# Patient Record
Sex: FEMALE | Race: WHITE | HISPANIC OR LATINO
[De-identification: names, ages, dates, MRNs, and addresses within clinical notes are randomized per-mention and may not be internally consistent; named-entity substitution may affect disease eponyms.]

---

## 2018-08-19 ENCOUNTER — TRANSCRIPTION ENCOUNTER (OUTPATIENT)
Age: 38
End: 2018-08-19

## 2018-10-24 ENCOUNTER — APPOINTMENT (OUTPATIENT)
Dept: OBGYN | Facility: CLINIC | Age: 38
End: 2018-10-24
Payer: COMMERCIAL

## 2018-10-24 VITALS
WEIGHT: 118 LBS | BODY MASS INDEX: 23.79 KG/M2 | DIASTOLIC BLOOD PRESSURE: 85 MMHG | SYSTOLIC BLOOD PRESSURE: 127 MMHG | HEIGHT: 59 IN

## 2018-10-24 DIAGNOSIS — Z87.440 PERSONAL HISTORY OF URINARY (TRACT) INFECTIONS: ICD-10-CM

## 2018-10-24 PROCEDURE — 99385 PREV VISIT NEW AGE 18-39: CPT

## 2018-10-26 LAB — HPV HIGH+LOW RISK DNA PNL CVX: NOT DETECTED

## 2018-10-30 LAB — CYTOLOGY CVX/VAG DOC THIN PREP: NORMAL

## 2019-03-21 ENCOUNTER — APPOINTMENT (OUTPATIENT)
Dept: OBGYN | Facility: CLINIC | Age: 39
End: 2019-03-21
Payer: COMMERCIAL

## 2019-03-21 PROCEDURE — 99213 OFFICE O/P EST LOW 20 MIN: CPT

## 2019-03-21 NOTE — CHIEF COMPLAINT
[Follow Up] : follow up GYN visit [FreeTextEntry1] : Pt presents for removal of paraguard. Has had it for 10 years.  Does not wish to have it removed without starting another form of contraception. Is interested in arm implant.

## 2019-04-08 ENCOUNTER — APPOINTMENT (OUTPATIENT)
Dept: OBGYN | Facility: CLINIC | Age: 39
End: 2019-04-08
Payer: COMMERCIAL

## 2019-04-08 VITALS
BODY MASS INDEX: 24.39 KG/M2 | HEIGHT: 59 IN | DIASTOLIC BLOOD PRESSURE: 92 MMHG | SYSTOLIC BLOOD PRESSURE: 135 MMHG | WEIGHT: 121 LBS

## 2019-04-08 PROCEDURE — 58301 REMOVE INTRAUTERINE DEVICE: CPT

## 2019-04-08 NOTE — PROCEDURE
[Paraguard] : Abdiel [ IUD] :  IUD [Consent Obtained] : consent was obtained prior to the procedure and is detailed in the patient's record [Time Started: ___] : Procedure Start Time: [unfilled] [Time Completed: ___] : Procedure Completion Time: [unfilled] [Speculum Placed] : a speculum was placed in the vagina [Strings Visualized] : the IUD strings were visualized [IUD Discarded] : discarded [Tolerated Well] : the patient tolerated the procedure well [No Complications] : none [de-identified] : wants OC and  getting vasectomy this month Loestrin sent

## 2019-07-12 ENCOUNTER — APPOINTMENT (OUTPATIENT)
Dept: OBGYN | Facility: CLINIC | Age: 39
End: 2019-07-12
Payer: COMMERCIAL

## 2019-07-12 VITALS — WEIGHT: 123 LBS | BODY MASS INDEX: 24.84 KG/M2 | DIASTOLIC BLOOD PRESSURE: 76 MMHG | SYSTOLIC BLOOD PRESSURE: 114 MMHG

## 2019-07-12 LAB
BILIRUB UR QL STRIP: NORMAL
GLUCOSE UR-MCNC: NORMAL
HCG UR QL: NORMAL EU/DL
HGB UR QL STRIP.AUTO: NORMAL
KETONES UR-MCNC: NORMAL
NITRITE UR QL STRIP: NORMAL
PH UR STRIP: 5.5
PROT UR STRIP-MCNC: 0.2
SP GR UR STRIP: 1.02

## 2019-07-12 PROCEDURE — 81002 URINALYSIS NONAUTO W/O SCOPE: CPT

## 2019-07-12 PROCEDURE — 99212 OFFICE O/P EST SF 10 MIN: CPT

## 2019-07-12 NOTE — PHYSICAL EXAM
[Labia Majora] : labia major [Labia Minora] : labia minora [Normal] : clitoris [FreeTextEntry4] : moderate white thick discharge adherent to walls Affirm done

## 2019-07-15 LAB
CANDIDA VAG CYTO: DETECTED
G VAGINALIS+PREV SP MTYP VAG QL MICRO: DETECTED
T VAGINALIS VAG QL WET PREP: NOT DETECTED

## 2019-10-30 ENCOUNTER — APPOINTMENT (OUTPATIENT)
Dept: OBGYN | Facility: CLINIC | Age: 39
End: 2019-10-30
Payer: COMMERCIAL

## 2019-10-30 VITALS
SYSTOLIC BLOOD PRESSURE: 125 MMHG | WEIGHT: 125 LBS | HEIGHT: 59 IN | DIASTOLIC BLOOD PRESSURE: 84 MMHG | BODY MASS INDEX: 25.2 KG/M2

## 2019-10-30 PROCEDURE — 99395 PREV VISIT EST AGE 18-39: CPT

## 2019-10-31 LAB — HPV HIGH+LOW RISK DNA PNL CVX: NOT DETECTED

## 2019-11-06 LAB — CYTOLOGY CVX/VAG DOC THIN PREP: ABNORMAL

## 2019-11-13 ENCOUNTER — OTHER (OUTPATIENT)
Age: 39
End: 2019-11-13

## 2019-11-13 ENCOUNTER — APPOINTMENT (OUTPATIENT)
Dept: OBGYN | Facility: CLINIC | Age: 39
End: 2019-11-13
Payer: COMMERCIAL

## 2019-11-13 ENCOUNTER — ASOB RESULT (OUTPATIENT)
Age: 39
End: 2019-11-13

## 2019-11-13 PROCEDURE — 76830 TRANSVAGINAL US NON-OB: CPT

## 2019-12-23 ENCOUNTER — APPOINTMENT (OUTPATIENT)
Dept: INTERNAL MEDICINE | Facility: CLINIC | Age: 39
End: 2019-12-23
Payer: COMMERCIAL

## 2019-12-23 VITALS
DIASTOLIC BLOOD PRESSURE: 70 MMHG | HEIGHT: 59 IN | BODY MASS INDEX: 26 KG/M2 | WEIGHT: 129 LBS | SYSTOLIC BLOOD PRESSURE: 112 MMHG | TEMPERATURE: 97.9 F

## 2019-12-23 DIAGNOSIS — Z87.42 PERSONAL HISTORY OF OTHER DISEASES OF THE FEMALE GENITAL TRACT: ICD-10-CM

## 2019-12-23 PROCEDURE — 36415 COLL VENOUS BLD VENIPUNCTURE: CPT

## 2019-12-23 PROCEDURE — 99204 OFFICE O/P NEW MOD 45 MIN: CPT | Mod: 25

## 2019-12-23 RX ORDER — NORETHINDRONE ACETATE AND ETHINYL ESTRADIOL AND FERROUS FUMARATE 1MG-20(21)
1-20 KIT ORAL
Qty: 3 | Refills: 4 | Status: DISCONTINUED | COMMUNITY
Start: 2019-04-08 | End: 2019-12-23

## 2019-12-23 RX ORDER — METRONIDAZOLE 7.5 MG/G
0.75 GEL VAGINAL
Qty: 1 | Refills: 5 | Status: DISCONTINUED | COMMUNITY
Start: 2019-07-26 | End: 2019-12-23

## 2019-12-23 RX ORDER — TERCONAZOLE 8 MG/G
0.8 CREAM VAGINAL
Qty: 1 | Refills: 3 | Status: DISCONTINUED | COMMUNITY
Start: 2019-07-12 | End: 2019-12-23

## 2019-12-23 RX ORDER — METRONIDAZOLE 7.5 MG/G
0.75 GEL VAGINAL
Qty: 1 | Refills: 3 | Status: DISCONTINUED | COMMUNITY
Start: 2019-07-15 | End: 2019-12-23

## 2019-12-23 NOTE — REVIEW OF SYSTEMS
[Abdominal Pain] : abdominal pain [Negative] : Psychiatric [Nausea] : no nausea [Vomiting] : no vomiting [Constipation] : no constipation [Diarrhea] : diarrhea [Heartburn] : no heartburn [Melena] : no melena

## 2019-12-23 NOTE — HISTORY OF PRESENT ILLNESS
[FreeTextEntry1] : CC: Physical [de-identified] : Presenting w/ annual physical. \par No acute complaints\par  [FreeTextEntry8] : CC: New patient\par \par 40 y/o f. hx of HLD presenting for follow up on HLD.  Diagnosed w/ elevated LDL in February & was advised to start lifestyle modifications. Patient states she has been noncompliant with dietary changes.  No regular exercise.\par Also notes intermittent gas pain. Denies constipation.\par

## 2019-12-23 NOTE — PHYSICAL EXAM
[No Abdominal Bruit] : a ~M bruit was not heard ~T in the abdomen [No Edema] : there was no peripheral edema [Normal] : affect was normal and insight and judgment were intact

## 2019-12-23 NOTE — HEALTH RISK ASSESSMENT
[Patient reported PAP Smear was normal] : Patient reported PAP Smear was normal [PapSmearDate] : 10/19

## 2019-12-23 NOTE — REVIEW OF SYSTEMS
[Abdominal Pain] : abdominal pain [Negative] : Psychiatric [Nausea] : no nausea [Diarrhea] : diarrhea [Constipation] : no constipation [Vomiting] : no vomiting [Heartburn] : no heartburn [Melena] : no melena

## 2019-12-23 NOTE — HISTORY OF PRESENT ILLNESS
[de-identified] : Presenting w/ annual physical. \par No acute complaints\par  [FreeTextEntry1] : CC: Physical [FreeTextEntry8] : CC: New patient\par \par 38 y/o f. hx of HLD presenting for follow up on HLD.  Diagnosed w/ elevated LDL in February & was advised to start lifestyle modifications. Patient states she has been noncompliant with dietary changes.  No regular exercise.\par Also notes intermittent gas pain. Denies constipation.\par

## 2019-12-24 LAB
ALBUMIN SERPL ELPH-MCNC: 4.2 G/DL
ALP BLD-CCNC: 69 U/L
ALT SERPL-CCNC: 17 U/L
ANION GAP SERPL CALC-SCNC: 12 MMOL/L
AST SERPL-CCNC: 17 U/L
BILIRUB SERPL-MCNC: <0.2 MG/DL
BUN SERPL-MCNC: 9 MG/DL
CALCIUM SERPL-MCNC: 9 MG/DL
CHLORIDE SERPL-SCNC: 106 MMOL/L
CHOLEST SERPL-MCNC: 188 MG/DL
CHOLEST/HDLC SERPL: 4.1 RATIO
CO2 SERPL-SCNC: 23 MMOL/L
CREAT SERPL-MCNC: 0.54 MG/DL
HDLC SERPL-MCNC: 46 MG/DL
LDLC SERPL CALC-MCNC: 92 MG/DL
POTASSIUM SERPL-SCNC: 4.4 MMOL/L
PROT SERPL-MCNC: 7.2 G/DL
SODIUM SERPL-SCNC: 141 MMOL/L
TRIGL SERPL-MCNC: 250 MG/DL

## 2020-03-23 ENCOUNTER — APPOINTMENT (OUTPATIENT)
Dept: INTERNAL MEDICINE | Facility: CLINIC | Age: 40
End: 2020-03-23
Payer: COMMERCIAL

## 2020-03-23 VITALS
OXYGEN SATURATION: 97 % | HEIGHT: 59 IN | TEMPERATURE: 98.1 F | SYSTOLIC BLOOD PRESSURE: 111 MMHG | HEART RATE: 68 BPM | DIASTOLIC BLOOD PRESSURE: 73 MMHG | WEIGHT: 128 LBS | RESPIRATION RATE: 16 BRPM | BODY MASS INDEX: 25.8 KG/M2

## 2020-03-23 PROCEDURE — 36415 COLL VENOUS BLD VENIPUNCTURE: CPT

## 2020-03-23 PROCEDURE — 99213 OFFICE O/P EST LOW 20 MIN: CPT | Mod: 25

## 2020-03-25 LAB
BASOPHILS # BLD AUTO: 0.04 K/UL
BASOPHILS NFR BLD AUTO: 0.6 %
CHOLEST SERPL-MCNC: 205 MG/DL
CHOLEST/HDLC SERPL: 4.4 RATIO
EOSINOPHIL # BLD AUTO: 0.04 K/UL
EOSINOPHIL NFR BLD AUTO: 0.6 %
ESTIMATED AVERAGE GLUCOSE: 103 MG/DL
HBA1C MFR BLD HPLC: 5.2 %
HCT VFR BLD CALC: 41.6 %
HDLC SERPL-MCNC: 47 MG/DL
HGB BLD-MCNC: 13.4 G/DL
IMM GRANULOCYTES NFR BLD AUTO: 0.1 %
LDLC SERPL CALC-MCNC: 101 MG/DL
LYMPHOCYTES # BLD AUTO: 1.75 K/UL
LYMPHOCYTES NFR BLD AUTO: 24.5 %
MAN DIFF?: NORMAL
MCHC RBC-ENTMCNC: 29.9 PG
MCHC RBC-ENTMCNC: 32.2 GM/DL
MCV RBC AUTO: 92.9 FL
MONOCYTES # BLD AUTO: 0.47 K/UL
MONOCYTES NFR BLD AUTO: 6.6 %
NEUTROPHILS # BLD AUTO: 4.84 K/UL
NEUTROPHILS NFR BLD AUTO: 67.6 %
PLATELET # BLD AUTO: 288 K/UL
RBC # BLD: 4.48 M/UL
RBC # FLD: 13.1 %
TRIGL SERPL-MCNC: 287 MG/DL
TSH SERPL-ACNC: 2.87 UIU/ML
WBC # FLD AUTO: 7.15 K/UL

## 2020-03-30 ENCOUNTER — APPOINTMENT (OUTPATIENT)
Dept: INTERNAL MEDICINE | Facility: CLINIC | Age: 40
End: 2020-03-30

## 2020-03-31 ENCOUNTER — APPOINTMENT (OUTPATIENT)
Dept: ENDOCRINOLOGY | Facility: CLINIC | Age: 40
End: 2020-03-31

## 2020-04-15 ENCOUNTER — APPOINTMENT (OUTPATIENT)
Dept: INTERNAL MEDICINE | Facility: CLINIC | Age: 40
End: 2020-04-15
Payer: COMMERCIAL

## 2020-04-15 PROCEDURE — 99213 OFFICE O/P EST LOW 20 MIN: CPT | Mod: 95

## 2020-04-15 NOTE — HISTORY OF PRESENT ILLNESS
[Verbal consent obtained from patient] : the patient, [unfilled] [No] : none [Mild] : mild [Diarrhea] : diarrhea [Improved] : improved [Home] : at home, [unfilled] , at the time of the visit. [Medical Office: ([facility])___] : at the medical office located in  [Patient] : the patient [FreeTextEntry1] : Follow up on suspected COVID 19 [de-identified] : Patient has not been hospitalized for COVID-19. \par Patient has had 19 days of symptoms. \par Fever: none \par Cough: none \par Difficulty Breathing: none\par GI Symptoms: diarrhea \par Other Symptoms: Myalgias, fatigue \par Assessment: improved \par 38 y/o f. with suspected covid states still w/ fatigue and intermittent myalgias.  Feels pain in upper arms/chest region with chest tightness. Doing routine activities makes her tired.  Denies BURNS, palpitations.  \par Has diarrhea every 2-3 days however no associated vomiting or abdominal pain.  Notes some anorexia, but tries to eat small meals during the day.  Last took Tylenol about a week ago and prefers not to take it for now given she took almost daily for 10 days.  [TextBox_8] : 18 [TextBox_28] : Myalgias, fatigue

## 2020-04-15 NOTE — HISTORY OF PRESENT ILLNESS
[Verbal consent obtained from patient] : the patient, [unfilled] [No] : none [Mild] : mild [Diarrhea] : diarrhea [Improved] : improved [Home] : at home, [unfilled] , at the time of the visit. [Medical Office: ([facility])___] : at the medical office located in  [Patient] : the patient [FreeTextEntry1] : Follow up on suspected COVID 19 [de-identified] : Patient has not been hospitalized for COVID-19. \par Patient has had 19 days of symptoms. \par Fever: none \par Cough: none \par Difficulty Breathing: none\par GI Symptoms: diarrhea \par Other Symptoms: Myalgias, fatigue \par Assessment: improved \par 40 y/o f. with suspected covid states still w/ fatigue and intermittent myalgias.  Feels pain in upper arms/chest region with chest tightness. Doing routine activities makes her tired.  Denies BURNS, palpitations.  \par Has diarrhea every 2-3 days however no associated vomiting or abdominal pain.  Notes some anorexia, but tries to eat small meals during the day.  Last took Tylenol about a week ago and prefers not to take it for now given she took almost daily for 10 days.  [TextBox_8] : 18 [TextBox_28] : Myalgias, fatigue

## 2020-04-15 NOTE — PHYSICAL EXAM
[No Acute Distress] : no acute distress [No Accessory Muscle Use] : no accessory muscle use [de-identified] : Speaking in full sentences

## 2020-04-15 NOTE — REVIEW OF SYSTEMS
[Fatigue] : fatigue [Diarrhea] : diarrhea [Muscle Pain] : muscle pain [Fever] : no fever [Chills] : no chills [Chest Pain] : no chest pain [Palpitations] : no palpitations [Leg Claudication] : no leg claudication [Lower Ext Edema] : no lower extremity edema [Shortness Of Breath] : no shortness of breath [Wheezing] : no wheezing [Cough] : no cough [Abdominal Pain] : no abdominal pain

## 2020-04-15 NOTE — PHYSICAL EXAM
[No Acute Distress] : no acute distress [No Accessory Muscle Use] : no accessory muscle use [de-identified] : Speaking in full sentences

## 2020-04-21 ENCOUNTER — RX RENEWAL (OUTPATIENT)
Age: 40
End: 2020-04-21

## 2020-06-04 ENCOUNTER — APPOINTMENT (OUTPATIENT)
Dept: OBGYN | Facility: CLINIC | Age: 40
End: 2020-06-04
Payer: COMMERCIAL

## 2020-06-04 LAB
BILIRUB UR QL STRIP: NEGATIVE
CLARITY UR: CLEAR
COLLECTION METHOD: NORMAL
GLUCOSE UR-MCNC: NEGATIVE
HCG UR QL: 0.2 EU/DL
HGB UR QL STRIP.AUTO: NORMAL
KETONES UR-MCNC: NEGATIVE
LEUKOCYTE ESTERASE UR QL STRIP: NEGATIVE
NITRITE UR QL STRIP: NEGATIVE
PH UR STRIP: 6.5
PROT UR STRIP-MCNC: NEGATIVE
SP GR UR STRIP: 1.01

## 2020-06-04 PROCEDURE — 81003 URINALYSIS AUTO W/O SCOPE: CPT | Mod: QW

## 2020-06-04 PROCEDURE — 99211 OFF/OP EST MAY X REQ PHY/QHP: CPT

## 2020-06-04 RX ORDER — NITROFURANTOIN (MONOHYDRATE/MACROCRYSTALS) 25; 75 MG/1; MG/1
100 CAPSULE ORAL TWICE DAILY
Qty: 10 | Refills: 0 | Status: DISCONTINUED | COMMUNITY
Start: 2020-06-04 | End: 2020-06-04

## 2020-06-05 LAB
APPEARANCE: CLEAR
BACTERIA UR CULT: NORMAL
BACTERIA: NEGATIVE
BILIRUBIN URINE: NEGATIVE
BLOOD URINE: NEGATIVE
COLOR: NORMAL
GLUCOSE QUALITATIVE U: NEGATIVE
HYALINE CASTS: 1 /LPF
KETONES URINE: NEGATIVE
LEUKOCYTE ESTERASE URINE: NEGATIVE
MICROSCOPIC-UA: NORMAL
NITRITE URINE: NEGATIVE
PH URINE: 7
PROTEIN URINE: NEGATIVE
RED BLOOD CELLS URINE: 0 /HPF
SPECIFIC GRAVITY URINE: 1.01
SQUAMOUS EPITHELIAL CELLS: 3 /HPF
UROBILINOGEN URINE: NORMAL
WHITE BLOOD CELLS URINE: 2 /HPF

## 2020-11-05 ENCOUNTER — APPOINTMENT (OUTPATIENT)
Dept: INTERNAL MEDICINE | Facility: CLINIC | Age: 40
End: 2020-11-05
Payer: COMMERCIAL

## 2020-11-05 VITALS
OXYGEN SATURATION: 98 % | WEIGHT: 120 LBS | TEMPERATURE: 98.1 F | HEIGHT: 59 IN | SYSTOLIC BLOOD PRESSURE: 112 MMHG | HEART RATE: 59 BPM | RESPIRATION RATE: 16 BRPM | DIASTOLIC BLOOD PRESSURE: 77 MMHG | BODY MASS INDEX: 24.19 KG/M2

## 2020-11-05 DIAGNOSIS — Z87.898 PERSONAL HISTORY OF OTHER SPECIFIED CONDITIONS: ICD-10-CM

## 2020-11-05 DIAGNOSIS — Z86.19 PERSONAL HISTORY OF OTHER INFECTIOUS AND PARASITIC DISEASES: ICD-10-CM

## 2020-11-05 DIAGNOSIS — R39.9 UNSPECIFIED SYMPTOMS AND SIGNS INVOLVING THE GENITOURINARY SYSTEM: ICD-10-CM

## 2020-11-05 PROCEDURE — 99072 ADDL SUPL MATRL&STAF TM PHE: CPT

## 2020-11-05 PROCEDURE — 99213 OFFICE O/P EST LOW 20 MIN: CPT | Mod: 25

## 2020-11-05 PROCEDURE — G0008: CPT

## 2020-11-05 PROCEDURE — 90686 IIV4 VACC NO PRSV 0.5 ML IM: CPT

## 2020-11-05 RX ORDER — NITROFURANTOIN (MONOHYDRATE/MACROCRYSTALS) 25; 75 MG/1; MG/1
100 CAPSULE ORAL TWICE DAILY
Qty: 10 | Refills: 0 | Status: DISCONTINUED | COMMUNITY
Start: 2020-06-04 | End: 2020-11-05

## 2020-11-05 RX ORDER — ALBUTEROL SULFATE 90 UG/1
108 (90 BASE) INHALANT RESPIRATORY (INHALATION)
Qty: 1 | Refills: 0 | Status: DISCONTINUED | COMMUNITY
Start: 2020-03-30 | End: 2020-11-05

## 2020-11-05 RX ORDER — SIMETHICONE 80 MG/1
80 TABLET, CHEWABLE ORAL
Qty: 60 | Refills: 0 | Status: DISCONTINUED | COMMUNITY
Start: 2019-12-23 | End: 2020-11-05

## 2020-11-05 NOTE — HISTORY OF PRESENT ILLNESS
[FreeTextEntry8] : CC: Lumbago \par \par Complaining of right side lower back pain for 2-3 months, occurs intermittently.  Also w/ neck pain, feels twisting sensation when moving her neck.  Currently working from home and usually on computer daily. \par Denies radiation of pain or trauma. \par Has not tried any OTC agents for pain.

## 2020-11-05 NOTE — PHYSICAL EXAM
[Normal] : normal rate, regular rhythm, normal S1 and S2 and no murmur heard [RIght Trapezius Muscle] : right trapezius muscle [No Visual Abnormalities] : no visible abnormalities [Restricted] : was restricted [Pain] : was painful [No Joint Swelling] : no joint swelling [Grossly Normal Strength/Tone] : grossly normal strength/tone

## 2020-11-05 NOTE — REVIEW OF SYSTEMS
[Muscle Pain] : muscle pain [Back Pain] : back pain [Negative] : Genitourinary [Joint Swelling] : no joint swelling [Muscle Weakness] : no muscle weakness

## 2021-01-07 ENCOUNTER — NON-APPOINTMENT (OUTPATIENT)
Age: 41
End: 2021-01-07

## 2021-01-07 ENCOUNTER — APPOINTMENT (OUTPATIENT)
Dept: OBGYN | Facility: CLINIC | Age: 41
End: 2021-01-07
Payer: COMMERCIAL

## 2021-01-07 VITALS
SYSTOLIC BLOOD PRESSURE: 133 MMHG | HEIGHT: 59 IN | BODY MASS INDEX: 24.19 KG/M2 | DIASTOLIC BLOOD PRESSURE: 81 MMHG | WEIGHT: 120 LBS

## 2021-01-07 PROCEDURE — 99072 ADDL SUPL MATRL&STAF TM PHE: CPT

## 2021-01-07 PROCEDURE — 99395 PREV VISIT EST AGE 18-39: CPT

## 2021-01-07 NOTE — HISTORY OF PRESENT ILLNESS
[FreeTextEntry1] : pt is a 39 y/o p2 lmp 12/4 presents for annual gyn visit with complaint of heavy discharge

## 2021-01-11 LAB
CANDIDA VAG CYTO: DETECTED
G VAGINALIS+PREV SP MTYP VAG QL MICRO: DETECTED
HPV HIGH+LOW RISK DNA PNL CVX: NOT DETECTED
T VAGINALIS VAG QL WET PREP: NOT DETECTED

## 2021-01-12 ENCOUNTER — NON-APPOINTMENT (OUTPATIENT)
Age: 41
End: 2021-01-12

## 2021-01-12 LAB — CYTOLOGY CVX/VAG DOC THIN PREP: ABNORMAL

## 2021-02-24 ENCOUNTER — APPOINTMENT (OUTPATIENT)
Dept: INTERNAL MEDICINE | Facility: CLINIC | Age: 41
End: 2021-02-24
Payer: COMMERCIAL

## 2021-02-24 VITALS
RESPIRATION RATE: 16 BRPM | WEIGHT: 122 LBS | HEIGHT: 59 IN | HEART RATE: 94 BPM | TEMPERATURE: 98.5 F | BODY MASS INDEX: 24.6 KG/M2 | DIASTOLIC BLOOD PRESSURE: 80 MMHG | OXYGEN SATURATION: 97 % | SYSTOLIC BLOOD PRESSURE: 136 MMHG

## 2021-02-24 DIAGNOSIS — Z87.39 PERSONAL HISTORY OF OTHER DISEASES OF THE MUSCULOSKELETAL SYSTEM AND CONNECTIVE TISSUE: ICD-10-CM

## 2021-02-24 DIAGNOSIS — R14.2 FLATULENCE: ICD-10-CM

## 2021-02-24 DIAGNOSIS — M62.838 OTHER MUSCLE SPASM: ICD-10-CM

## 2021-02-24 DIAGNOSIS — Z30.09 ENCOUNTER FOR OTHER GENERAL COUNSELING AND ADVICE ON CONTRACEPTION: ICD-10-CM

## 2021-02-24 DIAGNOSIS — B96.89 ACUTE VAGINITIS: ICD-10-CM

## 2021-02-24 DIAGNOSIS — B37.9 CANDIDIASIS, UNSPECIFIED: ICD-10-CM

## 2021-02-24 DIAGNOSIS — K21.9 GASTRO-ESOPHAGEAL REFLUX DISEASE W/OUT ESOPHAGITIS: ICD-10-CM

## 2021-02-24 DIAGNOSIS — Z01.419 ENCOUNTER FOR GYNECOLOGICAL EXAMINATION (GENERAL) (ROUTINE) W/OUT ABNORMAL FINDINGS: ICD-10-CM

## 2021-02-24 DIAGNOSIS — M54.5 LOW BACK PAIN: ICD-10-CM

## 2021-02-24 DIAGNOSIS — Z87.898 PERSONAL HISTORY OF OTHER SPECIFIED CONDITIONS: ICD-10-CM

## 2021-02-24 DIAGNOSIS — G89.29 LOW BACK PAIN: ICD-10-CM

## 2021-02-24 DIAGNOSIS — R14.1 FLATULENCE: ICD-10-CM

## 2021-02-24 DIAGNOSIS — R14.3 FLATULENCE: ICD-10-CM

## 2021-02-24 DIAGNOSIS — N76.0 ACUTE VAGINITIS: ICD-10-CM

## 2021-02-24 DIAGNOSIS — Z91.09 OTHER ALLERGY STATUS, OTHER THAN TO DRUGS AND BIOLOGICAL SUBSTANCES: ICD-10-CM

## 2021-02-24 PROCEDURE — 99072 ADDL SUPL MATRL&STAF TM PHE: CPT

## 2021-02-24 PROCEDURE — G0444 DEPRESSION SCREEN ANNUAL: CPT

## 2021-02-24 PROCEDURE — 99395 PREV VISIT EST AGE 18-39: CPT | Mod: 25

## 2021-02-24 PROCEDURE — 99213 OFFICE O/P EST LOW 20 MIN: CPT | Mod: 25

## 2021-02-24 PROCEDURE — 36415 COLL VENOUS BLD VENIPUNCTURE: CPT

## 2021-02-24 RX ORDER — NAPROXEN 500 MG/1
500 TABLET ORAL
Qty: 20 | Refills: 0 | Status: DISCONTINUED | COMMUNITY
Start: 2020-11-05 | End: 2021-02-24

## 2021-02-24 RX ORDER — CYCLOBENZAPRINE HYDROCHLORIDE 5 MG/1
5 TABLET, FILM COATED ORAL
Qty: 10 | Refills: 0 | Status: DISCONTINUED | COMMUNITY
Start: 2020-11-05 | End: 2021-02-24

## 2021-02-24 RX ORDER — FLUCONAZOLE 150 MG/1
150 TABLET ORAL
Qty: 2 | Refills: 0 | Status: DISCONTINUED | COMMUNITY
Start: 2021-01-12 | End: 2021-02-24

## 2021-02-24 RX ORDER — METRONIDAZOLE 500 MG/1
500 TABLET ORAL TWICE DAILY
Qty: 14 | Refills: 1 | Status: DISCONTINUED | COMMUNITY
Start: 2021-01-12 | End: 2021-02-24

## 2021-02-24 NOTE — PLAN
[FreeTextEntry1] : Possibly related to seasonal allergies-trial of antihistamine\par Counseled on dietary changes to reduce episodes of reflux which may be contributing to phelgm.\par ENT referral provided if no improvement with above recommendations\par

## 2021-02-24 NOTE — HEALTH RISK ASSESSMENT
[0] : 2) Feeling down, depressed, or hopeless: Not at all (0) [Feels Safe at Home] : Feels safe at home [Fully functional (bathing, dressing, toileting, transferring, walking, feeding)] : Fully functional (bathing, dressing, toileting, transferring, walking, feeding) [Fully functional (using the telephone, shopping, preparing meals, housekeeping, doing laundry, using] : Fully functional and needs no help or supervision to perform IADLs (using the telephone, shopping, preparing meals, housekeeping, doing laundry, using transportation, managing medications and managing finances) [Smoke Detector] : smoke detector [Carbon Monoxide Detector] : carbon monoxide detector [Reports changes in vision] : Reports changes in vision [Reports changes in hearing] : Reports no changes in hearing

## 2021-02-24 NOTE — HISTORY OF PRESENT ILLNESS
[FreeTextEntry1] : CPE [de-identified] : Here for CPE\par UTD w/ gyn, exercising 3-4x per week\par Mood is stable\par \par Also complaining of dry throat with phlegm production, worse in the AM. Phlegm is clear.   Also feels ear pressure b/l for past few months. \par Has intermittent reflux triggered by fried or spicy food. Tries to avoid.\par Denies cough, wheezing or stridor.

## 2021-02-24 NOTE — REVIEW OF SYSTEMS
[Earache] : earache [Postnasal Drip] : postnasal drip [Heartburn] : heartburn [Negative] : Heme/Lymph [Hearing Loss] : no hearing loss [Hoarseness] : no hoarseness [Nasal Discharge] : no nasal discharge [Sore Throat] : no sore throat

## 2021-02-25 LAB
25(OH)D3 SERPL-MCNC: 29 NG/ML
ALBUMIN SERPL ELPH-MCNC: 4.4 G/DL
ALP BLD-CCNC: 55 U/L
ALT SERPL-CCNC: 14 U/L
ANION GAP SERPL CALC-SCNC: 13 MMOL/L
AST SERPL-CCNC: 17 U/L
BASOPHILS # BLD AUTO: 0.04 K/UL
BASOPHILS NFR BLD AUTO: 0.6 %
BILIRUB SERPL-MCNC: 0.3 MG/DL
BUN SERPL-MCNC: 8 MG/DL
CALCIUM SERPL-MCNC: 9.2 MG/DL
CHLORIDE SERPL-SCNC: 106 MMOL/L
CHOLEST SERPL-MCNC: 206 MG/DL
CO2 SERPL-SCNC: 20 MMOL/L
CREAT SERPL-MCNC: 0.68 MG/DL
EOSINOPHIL # BLD AUTO: 0.03 K/UL
EOSINOPHIL NFR BLD AUTO: 0.5 %
ESTIMATED AVERAGE GLUCOSE: 103 MG/DL
GLUCOSE SERPL-MCNC: 90 MG/DL
HBA1C MFR BLD HPLC: 5.2 %
HCT VFR BLD CALC: 43.3 %
HDLC SERPL-MCNC: 50 MG/DL
HGB BLD-MCNC: 14.1 G/DL
IMM GRANULOCYTES NFR BLD AUTO: 0.2 %
LDLC SERPL CALC-MCNC: 130 MG/DL
LYMPHOCYTES # BLD AUTO: 1.92 K/UL
LYMPHOCYTES NFR BLD AUTO: 31 %
MAN DIFF?: NORMAL
MCHC RBC-ENTMCNC: 30.1 PG
MCHC RBC-ENTMCNC: 32.6 GM/DL
MCV RBC AUTO: 92.3 FL
MONOCYTES # BLD AUTO: 0.35 K/UL
MONOCYTES NFR BLD AUTO: 5.6 %
NEUTROPHILS # BLD AUTO: 3.85 K/UL
NEUTROPHILS NFR BLD AUTO: 62.1 %
NONHDLC SERPL-MCNC: 157 MG/DL
PLATELET # BLD AUTO: 290 K/UL
POTASSIUM SERPL-SCNC: 4 MMOL/L
PROT SERPL-MCNC: 7.1 G/DL
RBC # BLD: 4.69 M/UL
RBC # FLD: 13 %
SODIUM SERPL-SCNC: 140 MMOL/L
TRIGL SERPL-MCNC: 132 MG/DL
TSH SERPL-ACNC: 2.03 UIU/ML
WBC # FLD AUTO: 6.2 K/UL

## 2021-03-02 ENCOUNTER — TRANSCRIPTION ENCOUNTER (OUTPATIENT)
Age: 41
End: 2021-03-02

## 2021-03-03 ENCOUNTER — TRANSCRIPTION ENCOUNTER (OUTPATIENT)
Age: 41
End: 2021-03-03

## 2021-03-04 ENCOUNTER — NON-APPOINTMENT (OUTPATIENT)
Age: 41
End: 2021-03-04

## 2021-08-04 ENCOUNTER — NON-APPOINTMENT (OUTPATIENT)
Age: 41
End: 2021-08-04

## 2021-09-01 ENCOUNTER — LABORATORY RESULT (OUTPATIENT)
Age: 41
End: 2021-09-01

## 2021-09-01 ENCOUNTER — APPOINTMENT (OUTPATIENT)
Dept: INTERNAL MEDICINE | Facility: CLINIC | Age: 41
End: 2021-09-01
Payer: COMMERCIAL

## 2021-09-01 VITALS
SYSTOLIC BLOOD PRESSURE: 117 MMHG | TEMPERATURE: 98.4 F | HEIGHT: 59 IN | DIASTOLIC BLOOD PRESSURE: 69 MMHG | BODY MASS INDEX: 24.19 KG/M2 | HEART RATE: 60 BPM | WEIGHT: 120 LBS | OXYGEN SATURATION: 99 %

## 2021-09-01 DIAGNOSIS — Z23 ENCOUNTER FOR IMMUNIZATION: ICD-10-CM

## 2021-09-01 DIAGNOSIS — R68.89 OTHER GENERAL SYMPTOMS AND SIGNS: ICD-10-CM

## 2021-09-01 DIAGNOSIS — H53.9 UNSPECIFIED VISUAL DISTURBANCE: ICD-10-CM
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
## 2021-09-01 DIAGNOSIS — G44.329 CHRONIC POST-TRAUMATIC HEADACHE, NOT INTRACTABLE: ICD-10-CM

## 2021-09-01 DIAGNOSIS — Z30.432 ENCOUNTER FOR REMOVAL OF INTRAUTERINE CONTRACEPTIVE DEVICE: ICD-10-CM

## 2021-09-01 DIAGNOSIS — Z20.822 CONTACT WITH AND (SUSPECTED) EXPOSURE TO COVID-19: ICD-10-CM

## 2021-09-01 PROCEDURE — 36415 COLL VENOUS BLD VENIPUNCTURE: CPT

## 2021-09-01 PROCEDURE — 99214 OFFICE O/P EST MOD 30 MIN: CPT | Mod: 25

## 2021-09-01 NOTE — HISTORY OF PRESENT ILLNESS
[FreeTextEntry1] : Follow up on chronic conditions [de-identified] : -HLD: LDL at 130, has modified dietary intake\par -Bite by tick about 3 weeks ago, asymptomatic currently\par -Had injury to head at age 6 or 7, roof fell onto left side of head. Since incident has been having intermittent HAs, at times tender to touch, resolves spontaneously, no focal weakness\par

## 2021-09-01 NOTE — REVIEW OF SYSTEMS
[Headache] : headache [Negative] : Gastrointestinal [Dizziness] : no dizziness [Fainting] : no fainting [Unsteady Walking] : no ataxia

## 2021-09-02 ENCOUNTER — TRANSCRIPTION ENCOUNTER (OUTPATIENT)
Age: 41
End: 2021-09-02

## 2021-09-02 LAB
CHOLEST SERPL-MCNC: 156 MG/DL
HDLC SERPL-MCNC: 51 MG/DL
LDLC SERPL CALC-MCNC: 80 MG/DL
NONHDLC SERPL-MCNC: 105 MG/DL
TRIGL SERPL-MCNC: 125 MG/DL

## 2021-09-07 ENCOUNTER — TRANSCRIPTION ENCOUNTER (OUTPATIENT)
Age: 41
End: 2021-09-07

## 2021-09-07 LAB — B BURGDOR IGG+IGM SER QL IB: NORMAL

## 2021-09-23 ENCOUNTER — APPOINTMENT (OUTPATIENT)
Dept: MRI IMAGING | Facility: CLINIC | Age: 41
End: 2021-09-23
Payer: COMMERCIAL

## 2021-09-23 ENCOUNTER — OUTPATIENT (OUTPATIENT)
Dept: OUTPATIENT SERVICES | Facility: HOSPITAL | Age: 41
LOS: 1 days | End: 2021-09-23
Payer: COMMERCIAL

## 2021-09-23 DIAGNOSIS — G44.329 CHRONIC POST-TRAUMATIC HEADACHE, NOT INTRACTABLE: ICD-10-CM

## 2021-09-23 PROCEDURE — 70551 MRI BRAIN STEM W/O DYE: CPT | Mod: 26

## 2021-09-23 PROCEDURE — 70551 MRI BRAIN STEM W/O DYE: CPT

## 2021-09-24 ENCOUNTER — TRANSCRIPTION ENCOUNTER (OUTPATIENT)
Age: 41
End: 2021-09-24

## 2021-11-23 ENCOUNTER — APPOINTMENT (OUTPATIENT)
Dept: INTERNAL MEDICINE | Facility: CLINIC | Age: 41
End: 2021-11-23

## 2022-01-03 ENCOUNTER — APPOINTMENT (OUTPATIENT)
Dept: INTERNAL MEDICINE | Facility: CLINIC | Age: 42
End: 2022-01-03
Payer: COMMERCIAL

## 2022-01-03 VITALS
RESPIRATION RATE: 16 BRPM | HEART RATE: 75 BPM | OXYGEN SATURATION: 94 % | SYSTOLIC BLOOD PRESSURE: 106 MMHG | TEMPERATURE: 98.4 F | HEIGHT: 59 IN | BODY MASS INDEX: 25 KG/M2 | DIASTOLIC BLOOD PRESSURE: 61 MMHG | WEIGHT: 124 LBS

## 2022-01-03 DIAGNOSIS — U07.1 COVID-19: ICD-10-CM

## 2022-01-03 PROCEDURE — 99213 OFFICE O/P EST LOW 20 MIN: CPT

## 2022-01-03 RX ORDER — BENZONATATE 100 MG/1
100 CAPSULE ORAL 3 TIMES DAILY
Qty: 21 | Refills: 0 | Status: ACTIVE | COMMUNITY
Start: 2022-01-03 | End: 1900-01-01

## 2022-01-03 NOTE — HISTORY OF PRESENT ILLNESS
[FreeTextEntry1] : f/u covid [de-identified] : tested + covid 12/22--txed at home--no fever\par some residual loss of voice, mild cough, sore throat--\par pt double covid vaccinated\par no meds or prior resp hx.

## 2022-01-03 NOTE — PHYSICAL EXAM
[Normal] : soft, non-tender, non-distended, no masses palpated, no HSM and normal bowel sounds [No Acute Distress] : no acute distress

## 2022-02-15 ENCOUNTER — APPOINTMENT (OUTPATIENT)
Dept: OBGYN | Facility: CLINIC | Age: 42
End: 2022-02-15
Payer: COMMERCIAL

## 2022-02-15 VITALS
HEIGHT: 59 IN | HEART RATE: 81 BPM | BODY MASS INDEX: 25.2 KG/M2 | DIASTOLIC BLOOD PRESSURE: 74 MMHG | WEIGHT: 125 LBS | SYSTOLIC BLOOD PRESSURE: 115 MMHG

## 2022-02-15 DIAGNOSIS — Z78.9 OTHER SPECIFIED HEALTH STATUS: ICD-10-CM

## 2022-02-15 DIAGNOSIS — R10.2 PELVIC AND PERINEAL PAIN: ICD-10-CM

## 2022-02-15 DIAGNOSIS — Z84.2 FAMILY HISTORY OF OTHER DISEASES OF THE GENITOURINARY SYSTEM: ICD-10-CM

## 2022-02-15 PROCEDURE — 99395 PREV VISIT EST AGE 18-39: CPT

## 2022-02-15 NOTE — PHYSICAL EXAM
[Appropriately responsive] : appropriately responsive [Alert] : alert [No Acute Distress] : no acute distress [No Murmurs] : no murmurs [Soft] : soft [Non-tender] : non-tender [Non-distended] : non-distended [No Lesions] : no lesions [No Mass] : no mass [Oriented x3] : oriented x3 [Examination Of The Breasts] : a normal appearance [No Masses] : no breast masses were palpable [Labia Majora] : normal [Labia Minora] : normal [Normal] : normal [Tenderness] : nontender [Uterine Adnexae] : normal [Adnexa Tenderness On The Left] : tender

## 2022-02-15 NOTE — DISCUSSION/SUMMARY
[FreeTextEntry1] : thin prep pap test, hpv, ua and urine culture ordered.\par referral for mammogram.\par TVUS ordered for left pelvic tenderness.\par f/u after TVUS complete.\par pt verbalized understanding.\par

## 2022-02-15 NOTE — COUNSELING
[Nutrition/ Exercise/ Weight Management] : nutrition, exercise, weight management [Sunscreen] : sunscreen [Breast Self Exam] : breast self exam

## 2022-02-15 NOTE — HISTORY OF PRESENT ILLNESS
[Patient reported PAP Smear was normal] : Patient reported PAP Smear was normal [PapSmeardate] : 1/7/2021 [Approximately ___ (Month)] : LMP was approximately [unfilled] month(s) ago [Normal Amount/Duration] :  normal amount and duration [Frequency: Q ___ days] : menstrual periods occur approximately every [unfilled] days [FreeTextEntry1] : 2/5/2022 [Currently Active] : currently active [Men] : men [Vaginal] : vaginal [No] : No [Patient refuses STI testing] : Patient refuses STI testing

## 2022-02-18 DIAGNOSIS — N39.0 URINARY TRACT INFECTION, SITE NOT SPECIFIED: ICD-10-CM

## 2022-02-18 LAB
APPEARANCE: CLEAR
BACTERIA: NEGATIVE
BILIRUBIN URINE: NEGATIVE
BLOOD URINE: NEGATIVE
COLOR: NORMAL
GLUCOSE QUALITATIVE U: NEGATIVE
HPV HIGH+LOW RISK DNA PNL CVX: NOT DETECTED
HYALINE CASTS: 0 /LPF
KETONES URINE: NEGATIVE
LEUKOCYTE ESTERASE URINE: NEGATIVE
MICROSCOPIC-UA: NORMAL
NITRITE URINE: NEGATIVE
PH URINE: 6
PROTEIN URINE: NEGATIVE
RED BLOOD CELLS URINE: 1 /HPF
SPECIFIC GRAVITY URINE: 1.01
SQUAMOUS EPITHELIAL CELLS: 1 /HPF
UROBILINOGEN URINE: NORMAL
WHITE BLOOD CELLS URINE: 1 /HPF

## 2022-02-22 LAB
BACTERIA UR CULT: ABNORMAL
CYTOLOGY CVX/VAG DOC THIN PREP: NORMAL

## 2022-03-08 ENCOUNTER — APPOINTMENT (OUTPATIENT)
Dept: OBGYN | Facility: CLINIC | Age: 42
End: 2022-03-08
Payer: COMMERCIAL

## 2022-03-08 ENCOUNTER — ASOB RESULT (OUTPATIENT)
Age: 42
End: 2022-03-08

## 2022-03-08 PROCEDURE — 76830 TRANSVAGINAL US NON-OB: CPT

## 2022-03-18 ENCOUNTER — NON-APPOINTMENT (OUTPATIENT)
Age: 42
End: 2022-03-18

## 2022-03-21 ENCOUNTER — NON-APPOINTMENT (OUTPATIENT)
Age: 42
End: 2022-03-21

## 2022-03-21 ENCOUNTER — RESULT CHARGE (OUTPATIENT)
Age: 42
End: 2022-03-21

## 2022-03-21 ENCOUNTER — APPOINTMENT (OUTPATIENT)
Dept: OBGYN | Facility: CLINIC | Age: 42
End: 2022-03-21
Payer: COMMERCIAL

## 2022-03-21 DIAGNOSIS — N83.209 UNSPECIFIED OVARIAN CYST, UNSPECIFIED SIDE: ICD-10-CM

## 2022-03-21 LAB
BILIRUB UR QL STRIP: NORMAL
CLARITY UR: CLEAR
COLLECTION METHOD: NORMAL
GLUCOSE UR-MCNC: NORMAL
HCG UR QL: 0.2 EU/DL
HGB UR QL STRIP.AUTO: NORMAL
KETONES UR-MCNC: NORMAL
LEUKOCYTE ESTERASE UR QL STRIP: NORMAL
NITRITE UR QL STRIP: NORMAL
PH UR STRIP: 7
PROT UR STRIP-MCNC: NORMAL
SP GR UR STRIP: 1.01

## 2022-03-21 PROCEDURE — 81003 URINALYSIS AUTO W/O SCOPE: CPT | Mod: QW

## 2022-03-25 LAB — BACTERIA UR CULT: ABNORMAL

## 2022-04-18 ENCOUNTER — APPOINTMENT (OUTPATIENT)
Dept: INTERNAL MEDICINE | Facility: CLINIC | Age: 42
End: 2022-04-18
Payer: COMMERCIAL

## 2022-04-18 VITALS
SYSTOLIC BLOOD PRESSURE: 114 MMHG | OXYGEN SATURATION: 99 % | DIASTOLIC BLOOD PRESSURE: 79 MMHG | RESPIRATION RATE: 16 BRPM | HEIGHT: 59 IN | HEART RATE: 65 BPM | TEMPERATURE: 98 F

## 2022-04-18 DIAGNOSIS — R14.0 ABDOMINAL DISTENSION (GASEOUS): ICD-10-CM

## 2022-04-18 DIAGNOSIS — Z82.49 FAMILY HISTORY OF ISCHEMIC HEART DISEASE AND OTHER DISEASES OF THE CIRCULATORY SYSTEM: ICD-10-CM

## 2022-04-18 DIAGNOSIS — Z00.00 ENCOUNTER FOR GENERAL ADULT MEDICAL EXAMINATION W/OUT ABNORMAL FINDINGS: ICD-10-CM

## 2022-04-18 DIAGNOSIS — Z83.438 FAMILY HISTORY OF OTHER DISORDER OF LIPOPROTEIN METABOLISM AND OTHER LIPIDEMIA: ICD-10-CM

## 2022-04-18 PROCEDURE — 36415 COLL VENOUS BLD VENIPUNCTURE: CPT

## 2022-04-18 PROCEDURE — 99395 PREV VISIT EST AGE 18-39: CPT | Mod: 25

## 2022-04-18 NOTE — HISTORY OF PRESENT ILLNESS
[FreeTextEntry1] : cpx [de-identified] : recovered from covid\par no meds\par nonspecific, nontriggered intermittent RUQ pain for 4 yrs

## 2022-04-19 LAB
ALBUMIN SERPL ELPH-MCNC: 4.6 G/DL
ALP BLD-CCNC: 71 U/L
ALT SERPL-CCNC: 8 U/L
ANION GAP SERPL CALC-SCNC: 11 MMOL/L
APPEARANCE: CLEAR
AST SERPL-CCNC: 15 U/L
BACTERIA: NEGATIVE
BASOPHILS # BLD AUTO: 0.04 K/UL
BASOPHILS NFR BLD AUTO: 0.6 %
BILIRUB SERPL-MCNC: 0.2 MG/DL
BILIRUBIN URINE: NEGATIVE
BLOOD URINE: NEGATIVE
BUN SERPL-MCNC: 7 MG/DL
CALCIUM SERPL-MCNC: 9.4 MG/DL
CHLORIDE SERPL-SCNC: 104 MMOL/L
CHOLEST SERPL-MCNC: 191 MG/DL
CO2 SERPL-SCNC: 24 MMOL/L
COLOR: COLORLESS
CREAT SERPL-MCNC: 0.55 MG/DL
EGFR: 120 ML/MIN/1.73M2
EOSINOPHIL # BLD AUTO: 0.07 K/UL
EOSINOPHIL NFR BLD AUTO: 1.1 %
GLUCOSE QUALITATIVE U: NEGATIVE
GLUCOSE SERPL-MCNC: 94 MG/DL
HCT VFR BLD CALC: 43.3 %
HDLC SERPL-MCNC: 53 MG/DL
HGB BLD-MCNC: 14 G/DL
HYALINE CASTS: 2 /LPF
IMM GRANULOCYTES NFR BLD AUTO: 0.2 %
KETONES URINE: NEGATIVE
LDLC SERPL CALC-MCNC: 114 MG/DL
LEUKOCYTE ESTERASE URINE: NEGATIVE
LYMPHOCYTES # BLD AUTO: 2.24 K/UL
LYMPHOCYTES NFR BLD AUTO: 35.8 %
MAN DIFF?: NORMAL
MCHC RBC-ENTMCNC: 29.6 PG
MCHC RBC-ENTMCNC: 32.3 GM/DL
MCV RBC AUTO: 91.5 FL
MICROSCOPIC-UA: NORMAL
MONOCYTES # BLD AUTO: 0.59 K/UL
MONOCYTES NFR BLD AUTO: 9.4 %
NEUTROPHILS # BLD AUTO: 3.3 K/UL
NEUTROPHILS NFR BLD AUTO: 52.9 %
NITRITE URINE: NEGATIVE
NONHDLC SERPL-MCNC: 138 MG/DL
PH URINE: 6.5
PLATELET # BLD AUTO: 296 K/UL
POTASSIUM SERPL-SCNC: 3.9 MMOL/L
PROT SERPL-MCNC: 7.4 G/DL
PROTEIN URINE: NEGATIVE
RBC # BLD: 4.73 M/UL
RBC # FLD: 12.9 %
RED BLOOD CELLS URINE: 0 /HPF
SODIUM SERPL-SCNC: 139 MMOL/L
SPECIFIC GRAVITY URINE: 1
SQUAMOUS EPITHELIAL CELLS: 0 /HPF
TRIGL SERPL-MCNC: 118 MG/DL
TSH SERPL-ACNC: 1.49 UIU/ML
UROBILINOGEN URINE: NORMAL
WBC # FLD AUTO: 6.25 K/UL
WHITE BLOOD CELLS URINE: 1 /HPF

## 2022-09-06 ENCOUNTER — NON-APPOINTMENT (OUTPATIENT)
Age: 42
End: 2022-09-06

## 2022-09-06 ENCOUNTER — APPOINTMENT (OUTPATIENT)
Dept: OBGYN | Facility: CLINIC | Age: 42
End: 2022-09-06

## 2022-09-06 VITALS
SYSTOLIC BLOOD PRESSURE: 110 MMHG | HEIGHT: 59 IN | BODY MASS INDEX: 26.21 KG/M2 | DIASTOLIC BLOOD PRESSURE: 74 MMHG | WEIGHT: 130 LBS

## 2022-09-06 DIAGNOSIS — N84.1 POLYP OF CERVIX UTERI: ICD-10-CM

## 2022-09-06 DIAGNOSIS — R30.0 DYSURIA: ICD-10-CM

## 2022-09-06 PROBLEM — N83.209 OVARIAN CYST: Status: ACTIVE | Noted: 2022-09-06

## 2022-09-06 PROCEDURE — 81002 URINALYSIS NONAUTO W/O SCOPE: CPT

## 2022-09-06 PROCEDURE — 58558Z: CUSTOM

## 2022-09-06 PROCEDURE — 81025 URINE PREGNANCY TEST: CPT

## 2022-09-06 PROCEDURE — 99213 OFFICE O/P EST LOW 20 MIN: CPT

## 2022-09-06 RX ORDER — NITROFURANTOIN (MONOHYDRATE/MACROCRYSTALS) 25; 75 MG/1; MG/1
100 CAPSULE ORAL TWICE DAILY
Qty: 10 | Refills: 0 | Status: ACTIVE | COMMUNITY
Start: 2022-02-18 | End: 1900-01-01

## 2022-09-07 ENCOUNTER — RESULT CHARGE (OUTPATIENT)
Age: 42
End: 2022-09-07

## 2022-09-07 PROBLEM — N84.1 POLYP AT CERVICAL OS: Status: ACTIVE | Noted: 2022-09-06

## 2022-09-07 PROBLEM — R30.0 DYSURIA: Status: ACTIVE | Noted: 2022-09-06

## 2022-09-07 LAB
BILIRUB UR QL STRIP: NORMAL
CLARITY UR: NORMAL
COLLECTION METHOD: NORMAL
GLUCOSE UR-MCNC: NORMAL
HCG UR QL: NEGATIVE
HCG UR QL: NORMAL EU/DL
HGB UR QL STRIP.AUTO: NORMAL
KETONES UR-MCNC: NORMAL
LEUKOCYTE ESTERASE UR QL STRIP: NORMAL
NITRITE UR QL STRIP: NORMAL
PH UR STRIP: NORMAL
PROT UR STRIP-MCNC: NORMAL

## 2022-09-09 ENCOUNTER — NON-APPOINTMENT (OUTPATIENT)
Age: 42
End: 2022-09-09

## 2022-09-09 LAB
CANDIDA VAG CYTO: NOT DETECTED
G VAGINALIS+PREV SP MTYP VAG QL MICRO: NOT DETECTED
T VAGINALIS VAG QL WET PREP: NOT DETECTED

## 2022-09-12 LAB — BACTERIA UR CULT: ABNORMAL

## 2022-09-14 ENCOUNTER — NON-APPOINTMENT (OUTPATIENT)
Age: 42
End: 2022-09-14

## 2022-09-14 LAB — CORE LAB BIOPSY: NORMAL

## 2022-09-19 ENCOUNTER — APPOINTMENT (OUTPATIENT)
Dept: OBGYN | Facility: CLINIC | Age: 42
End: 2022-09-19

## 2022-09-19 ENCOUNTER — ASOB RESULT (OUTPATIENT)
Age: 42
End: 2022-09-19

## 2022-09-19 PROCEDURE — 76830 TRANSVAGINAL US NON-OB: CPT

## 2022-10-04 NOTE — CHIEF COMPLAINT
[FreeTextEntry1] : 41 y/o female, LMP: 8/15/22 presents for an urgent visit c/o urinary frequency, dysuria for past 2 days.  Also vaginal spotting since Sunday.\par \par ROS:  Denies fever/chills, HA, Cough/sore throat, CP, SOB, N/V, Diarrhea/Constipation, Pelvic pain, irregular vaginal bleeding, discharge or irritation.\par

## 2022-10-04 NOTE — PHYSICAL EXAM
[Chaperone Present] : A chaperone was present in the examining room during all aspects of the physical examination [Normal] : uterus [No Bleeding] : there was no active vaginal bleeding [Polyp ___ cm] : had a [unfilled] ~Ucm polyp [Uterine Adnexae] : were not tender and not enlarged [FreeTextEntry1] : Zuleyka ALVARADO [FreeTextEntry5] : cervix was washed w betadine.  polyp was removed using polyp forcep and twisting motion.  no issues removing polyp and no active VB noted after procedure.

## 2022-10-04 NOTE — DISCUSSION/SUMMARY
[FreeTextEntry1] : polyp removed \par urine cult and BV panel ordered.\par ua large leuks\par rx sent for macrobid\par referral provided for f/u tvus 2/2 ovarian cyst.\par GYN counseling: Patient instructed to call for Unusual Pelvic pain, UTI symptoms, irregular vaginal bleeding/discharge- Patient verbalized agreement\par F/U: patient to follow up if no relief.

## 2022-10-05 ENCOUNTER — NON-APPOINTMENT (OUTPATIENT)
Age: 42
End: 2022-10-05

## 2022-11-14 ENCOUNTER — APPOINTMENT (OUTPATIENT)
Dept: INTERNAL MEDICINE | Facility: CLINIC | Age: 42
End: 2022-11-14

## 2022-11-14 VITALS
HEART RATE: 70 BPM | OXYGEN SATURATION: 98 % | TEMPERATURE: 98.5 F | RESPIRATION RATE: 16 BRPM | DIASTOLIC BLOOD PRESSURE: 83 MMHG | HEIGHT: 59 IN | WEIGHT: 131 LBS | SYSTOLIC BLOOD PRESSURE: 132 MMHG | BODY MASS INDEX: 26.41 KG/M2

## 2022-11-14 DIAGNOSIS — J06.9 ACUTE UPPER RESPIRATORY INFECTION, UNSPECIFIED: ICD-10-CM

## 2022-11-14 PROCEDURE — 99213 OFFICE O/P EST LOW 20 MIN: CPT

## 2022-11-14 RX ORDER — BENZONATATE 100 MG/1
100 CAPSULE ORAL 3 TIMES DAILY
Qty: 21 | Refills: 0 | Status: ACTIVE | COMMUNITY
Start: 2022-11-14 | End: 1900-01-01

## 2022-11-14 RX ORDER — AZITHROMYCIN 250 MG/1
250 TABLET, FILM COATED ORAL
Qty: 1 | Refills: 0 | Status: ACTIVE | COMMUNITY
Start: 2022-11-14 | End: 1900-01-01

## 2022-11-14 NOTE — HISTORY OF PRESENT ILLNESS
[FreeTextEntry1] : uri [de-identified] : persistent cough, congestion--no fever\par tested neg for covid 5 days ago\par no meds

## 2023-05-02 ENCOUNTER — NON-APPOINTMENT (OUTPATIENT)
Age: 43
End: 2023-05-02

## 2023-05-02 ENCOUNTER — APPOINTMENT (OUTPATIENT)
Dept: INTERNAL MEDICINE | Facility: CLINIC | Age: 43
End: 2023-05-02
Payer: COMMERCIAL

## 2023-05-02 VITALS
BODY MASS INDEX: 25.6 KG/M2 | OXYGEN SATURATION: 95 % | TEMPERATURE: 98.1 F | RESPIRATION RATE: 16 BRPM | HEART RATE: 61 BPM | HEIGHT: 59 IN | WEIGHT: 127 LBS | SYSTOLIC BLOOD PRESSURE: 127 MMHG | DIASTOLIC BLOOD PRESSURE: 84 MMHG

## 2023-05-02 DIAGNOSIS — M54.41 LUMBAGO WITH SCIATICA, RIGHT SIDE: ICD-10-CM

## 2023-05-02 DIAGNOSIS — Z13.31 ENCOUNTER FOR SCREENING FOR DEPRESSION: ICD-10-CM

## 2023-05-02 DIAGNOSIS — Z00.00 ENCOUNTER FOR GENERAL ADULT MEDICAL EXAMINATION W/OUT ABNORMAL FINDINGS: ICD-10-CM

## 2023-05-02 DIAGNOSIS — E78.1 PURE HYPERGLYCERIDEMIA: ICD-10-CM

## 2023-05-02 DIAGNOSIS — Z29.9 ENCOUNTER FOR PROPHYLACTIC MEASURES, UNSPECIFIED: ICD-10-CM

## 2023-05-02 DIAGNOSIS — Z12.31 ENCOUNTER FOR SCREENING MAMMOGRAM FOR MALIGNANT NEOPLASM OF BREAST: ICD-10-CM

## 2023-05-02 DIAGNOSIS — E78.5 HYPERLIPIDEMIA, UNSPECIFIED: ICD-10-CM

## 2023-05-02 LAB
HCG UR QL: NEGATIVE
QUALITY CONTROL: NORMAL

## 2023-05-02 PROCEDURE — 93000 ELECTROCARDIOGRAM COMPLETE: CPT | Mod: 59

## 2023-05-02 PROCEDURE — 99213 OFFICE O/P EST LOW 20 MIN: CPT | Mod: 25

## 2023-05-02 PROCEDURE — 99396 PREV VISIT EST AGE 40-64: CPT | Mod: 25

## 2023-05-02 PROCEDURE — 81025 URINE PREGNANCY TEST: CPT

## 2023-05-02 RX ORDER — METHOCARBAMOL 750 MG/1
750 TABLET, FILM COATED ORAL EVERY 8 HOURS
Qty: 30 | Refills: 0 | Status: ACTIVE | COMMUNITY
Start: 2023-05-02 | End: 1900-01-01

## 2023-05-02 NOTE — ASSESSMENT
[FreeTextEntry1] : #CPE\par f/u blood and urine\par ekg - NSR no acute st or t wave changes - interpreted and reviewed results with pt.\par immunizations - reports up to date\par I spent 5 minutes with the patient conducting a screen using approved screening tool (PHQ9) and discussing results of said screen with patient during this encounter.\par The patient was counseled to get regular exercise and sleep, wear sunblock and wear a safety belt in the car.\par Check CBC, CMP Hgba1c , TSH and UA\par Pap Smear\par Mammo\par Colonoscopy at 47\par Ophtho\par Derm\par \par #R back pain\par - f/u  PT\par - F/u pain management\par - stretching exercises\par - trial of methocarbamol 750mg q 8 hrs prn\par - if have any changes in urination or passing stool with fever - go to ED\par pt agrees and understands plan via teach back method. all questions answered.\par \par \par

## 2023-05-02 NOTE — HISTORY OF PRESENT ILLNESS
[de-identified] : 42 year F  here for Complete Physical Exam.\par Vaccinations:\par covid x moderna x 3\par flu - due this fall. \par tdap - reports got in past 10 years\par \par Screening:\par last colonoscopy: due at 47\par denies any unintentional weight loss, blood in stool, anemia or dysphagia\par no family hx of colon cancer\par \par obgyn: NP Mohan Romerosi \par LMP: 1 week ago\par last pap: 1 year ago normal \par last mammo: due for mammogram - will get from obgyn\par Pregnancy: \par  - 1 vaginal delivery and 1 .\par no family hx of breast cancer.\par \par Surgical Hx:\par hx of sinus and \par \par Family Hx:\par Mother HTN\par Father HTN\par Maternal Grandfather HTN\par Maternal Grandmother HTN\par Paternal Grandfather unknown\par Paternal Grandmother unknown\par Siblings: - sister breast lump\par Children:2 healthy \par pt denies any family hx of breast, colon, cervical, endometrial, uterine, ovarian,  lung, prostate  or testicular cancer\par \par Social\par ETOH - socially \par Smoker - denies \par Illicit Drug use - denies\par \par Occupation:  for [employer]\par \par Pt c/o of R lower back pain radiating down R leg x for x 1 month\par Pt denies any trauma or fall.\par Pt reports was running on treadmill and felt while running.\par Pt reports stopped and pain went away.\par Pt reports pain came back.\par Pt reports pain is 10/10 worse ambulation, better when rest and sitting\par Pt reports associated with some numbness and tingling \par Pt saw chiropractor for pain and had xray of hip - denies any fracture \par Pt denies doing and PT\par Pt states Tylenol and Motrin no relief.\par Pt denies any changes in ambulation.\par Pt denies any changes in urination and defecation.\par Pt denies any numbness and tingling but has nerve pain down her feet.\par Pt has no further complaints.\par no weakness \par No urinary retention or overflow incontinence, no bowel movement changes or saddle paresthesia.\par No trauma\par \par

## 2023-05-02 NOTE — REVIEW OF SYSTEMS
[Negative] : Psychiatric [Back Pain] : back pain [Joint Pain] : no joint pain [Joint Stiffness] : no joint stiffness [Joint Swelling] : no joint swelling [Muscle Weakness] : no muscle weakness [Muscle Pain] : no muscle pain [FreeTextEntry9] : right lower back pain

## 2023-05-02 NOTE — PHYSICAL EXAM
[No Edema] : there was no peripheral edema [Declined Breast Exam] : declined breast exam  [Declined Rectal Exam] : declined rectal exam [Normal Posterior Cervical Nodes] : no posterior cervical lymphadenopathy [Normal Anterior Cervical Nodes] : no anterior cervical lymphadenopathy [Normal] : affect was normal and insight and judgment were intact [No CVA Tenderness] : no CVA  tenderness [No Spinal Tenderness] : no spinal tenderness [No Joint Swelling] : no joint swelling [Grossly Normal Strength/Tone] : grossly normal strength/tone [de-identified] : declined [de-identified] : + paraspinal tenderness in R side and + straight leg test on right side, negative left paraspinal tenderness and negative left straight leg test.

## 2023-05-02 NOTE — HEALTH RISK ASSESSMENT
[No] : No [No falls in past year] : Patient reported no falls in the past year [Not at All (0)] : 9.) Thoughts that you would be off dead or of hurting yourself in some way? Not at all [PHQ-9 Negative - No further assessment needed] : PHQ-9 Negative - No further assessment needed [Hepatitis C test offered] : Hepatitis C test offered [None] : None [Employed] : employed [] :  [Sexually Active] : sexually active [Feels Safe at Home] : Feels safe at home [Fully functional (bathing, dressing, toileting, transferring, walking, feeding)] : Fully functional (bathing, dressing, toileting, transferring, walking, feeding) [Fully functional (using the telephone, shopping, preparing meals, housekeeping, doing laundry, using] : Fully functional and needs no help or supervision to perform IADLs (using the telephone, shopping, preparing meals, housekeeping, doing laundry, using transportation, managing medications and managing finances) [Reports normal functional visual acuity (ie: able to read med bottle)] : Reports normal functional visual acuity [Smoke Detector] : smoke detector [Carbon Monoxide Detector] : carbon monoxide detector [Safety elements used in home] : safety elements used in home [Seat Belt] :  uses seat belt [Sunscreen] : uses sunscreen [Never] : Never [HIV test declined] : HIV test declined [With Family] : lives with family [High School] : high school [# Of Children ___] : has [unfilled] children [[id number]] : 0 [Change in mental status noted] : No change in mental status noted [Language] : denies difficulty with language [Behavior] : denies difficulty with behavior [Learning/Retaining New Information] : denies difficulty learning/retaining new information [Handling Complex Tasks] : denies difficulty handling complex tasks [Reasoning] : denies difficulty with reasoning [Spatial Ability and Orientation] : denies difficulty with spatial ability and orientation [High Risk Behavior] : no high risk behavior [Reports changes in hearing] : Reports no changes in hearing [Reports changes in vision] : Reports no changes in vision [Reports changes in dental health] : Reports no changes in dental health [Guns at Home] : no guns at home [Travel to Developing Areas] : does not  travel to developing areas [TB Exposure] : is not being exposed to tuberculosis [Caregiver Concerns] : does not have caregiver concerns

## 2023-05-06 ENCOUNTER — NON-APPOINTMENT (OUTPATIENT)
Age: 43
End: 2023-05-06

## 2023-05-06 LAB
25(OH)D3 SERPL-MCNC: 26.6 NG/ML
ALBUMIN SERPL ELPH-MCNC: 4.4 G/DL
ALP BLD-CCNC: 56 U/L
ALT SERPL-CCNC: 17 U/L
ANION GAP SERPL CALC-SCNC: 13 MMOL/L
APPEARANCE: CLEAR
AST SERPL-CCNC: 21 U/L
BACTERIA: ABNORMAL /HPF
BASOPHILS # BLD AUTO: 0.06 K/UL
BASOPHILS NFR BLD AUTO: 0.9 %
BILIRUB SERPL-MCNC: 0.3 MG/DL
BILIRUBIN URINE: NEGATIVE
BLOOD URINE: NEGATIVE
BUN SERPL-MCNC: 11 MG/DL
CALCIUM SERPL-MCNC: 9.4 MG/DL
CAST: 1 /LPF
CHLORIDE SERPL-SCNC: 104 MMOL/L
CHOLEST SERPL-MCNC: 217 MG/DL
CO2 SERPL-SCNC: 21 MMOL/L
COLOR: YELLOW
CREAT SERPL-MCNC: 0.6 MG/DL
EGFR: 116 ML/MIN/1.73M2
EOSINOPHIL # BLD AUTO: 0.1 K/UL
EOSINOPHIL NFR BLD AUTO: 1.5 %
EPITHELIAL CELLS: 7 /HPF
ESTIMATED AVERAGE GLUCOSE: 103 MG/DL
FOLATE SERPL-MCNC: 19.1 NG/ML
GLUCOSE QUALITATIVE U: NEGATIVE MG/DL
GLUCOSE SERPL-MCNC: 87 MG/DL
HBA1C MFR BLD HPLC: 5.2 %
HBV SURFACE AB SERPL IA-ACNC: 20.2 MIU/ML
HCT VFR BLD CALC: 39.9 %
HCV AB SER QL: NONREACTIVE
HCV S/CO RATIO: 0.14 S/CO
HDLC SERPL-MCNC: 50 MG/DL
HGB BLD-MCNC: 13.2 G/DL
IMM GRANULOCYTES NFR BLD AUTO: 0.1 %
KETONES URINE: NEGATIVE MG/DL
LDLC SERPL CALC-MCNC: 148 MG/DL
LEUKOCYTE ESTERASE URINE: ABNORMAL
LYMPHOCYTES # BLD AUTO: 2.08 K/UL
LYMPHOCYTES NFR BLD AUTO: 30.6 %
MAN DIFF?: NORMAL
MCHC RBC-ENTMCNC: 29.7 PG
MCHC RBC-ENTMCNC: 33.1 GM/DL
MCV RBC AUTO: 89.9 FL
MICROSCOPIC-UA: NORMAL
MONOCYTES # BLD AUTO: 0.49 K/UL
MONOCYTES NFR BLD AUTO: 7.2 %
NEUTROPHILS # BLD AUTO: 4.06 K/UL
NEUTROPHILS NFR BLD AUTO: 59.7 %
NITRITE URINE: NEGATIVE
NONHDLC SERPL-MCNC: 167 MG/DL
PH URINE: 6.5
PLATELET # BLD AUTO: 276 K/UL
POTASSIUM SERPL-SCNC: 3.9 MMOL/L
PROT SERPL-MCNC: 6.9 G/DL
PROTEIN URINE: NEGATIVE MG/DL
RBC # BLD: 4.44 M/UL
RBC # FLD: 13 %
RED BLOOD CELLS URINE: 1 /HPF
SODIUM SERPL-SCNC: 138 MMOL/L
SPECIFIC GRAVITY URINE: 1.01
TRIGL SERPL-MCNC: 98 MG/DL
TSH SERPL-ACNC: 2.13 UIU/ML
UROBILINOGEN URINE: 0.2 MG/DL
VIT B12 SERPL-MCNC: 953 PG/ML
WBC # FLD AUTO: 6.8 K/UL
WHITE BLOOD CELLS URINE: 4 /HPF

## 2023-05-08 ENCOUNTER — NON-APPOINTMENT (OUTPATIENT)
Age: 43
End: 2023-05-08

## 2023-05-08 ENCOUNTER — APPOINTMENT (OUTPATIENT)
Dept: ORTHOPEDIC SURGERY | Facility: CLINIC | Age: 43
End: 2023-05-08
Payer: COMMERCIAL

## 2023-05-08 VITALS
HEART RATE: 68 BPM | SYSTOLIC BLOOD PRESSURE: 130 MMHG | OXYGEN SATURATION: 97 % | TEMPERATURE: 98.3 F | DIASTOLIC BLOOD PRESSURE: 82 MMHG

## 2023-05-08 PROCEDURE — 72110 X-RAY EXAM L-2 SPINE 4/>VWS: CPT

## 2023-05-08 PROCEDURE — 99203 OFFICE O/P NEW LOW 30 MIN: CPT

## 2023-05-08 RX ORDER — NAPROXEN 500 MG/1
500 TABLET ORAL
Qty: 20 | Refills: 1 | Status: ACTIVE | COMMUNITY
Start: 2023-05-08 | End: 1900-01-01

## 2023-05-08 NOTE — HISTORY OF PRESENT ILLNESS
[de-identified] : CECI AGUILAR  is a 42 year year old F who presents with right-sided low back pain as well as right ankle pain and toe numbness.  She reports that about 5 weeks ago she began experiencing pain at the posterior aspect of her right low back.  She also began experiencing right ankle pain as well as some numbness and tingling in her right great toe.  She reports that when she is sitting down she feels the right-sided low back pain, but when she stands up she really does not feel that back pain and then starts to feel right sided ankle pain and the great toe numbness.  When she stands up she says the pain can be as bad as a 10 out of 10, when she is sitting she really has no pain.  She says that she has had intermittent issues with her low back and has seen a chiropractor for the past few years off and on.  She says that the chiropractor is able to resolve her back pain.  She says that last week she saw another doctor who prescribed her Medrol Dosepak.  She started this yesterday and says that overnight she felt much better, but in the morning when she started to walk she felt the pain again.  She works at a desk job.

## 2023-05-08 NOTE — PHYSICAL EXAM
[de-identified] : General: No apparent distress\par Cardiovascular: extremities warm and well-perfused, no cyanosis\par Pulmonary: non labored respirations\par \par Musculoskeletal:\par \par Lumbar Spine:\par Flexion/Extension: Able to flex and extend at waist without any pain\par Straight Leg Raise: Negative\par LE Motor Exam: 5/5 hip flexors/quad/EHL/TA/GS\par Sensory: SILT throughout all distributions\par \par Right ankle:\par Tender to palpation about anterior lateral ankle, minimal swelling\par Negative anterior drawer, negative ankle impingement [de-identified] : 4 views of the lumbar spine obtained today and interpreted by me including AP, lateral, flexion and extension views demonstrate no acute fracture or dislocation.  There are minimal degenerative changes about the lumbar spine

## 2023-05-08 NOTE — DISCUSSION/SUMMARY
[de-identified] : Low back pain with right ankle pain and great toe numbness.  Discussed with her that to little unclear if her pain is starting from her low back and radiating down into her foot or if she has separate issues in her low back and her right ankle/foot.  She has an MRI scheduled for the low back, so once this is done she will notify me so I can review the results.  In addition I will prescribe her physical therapy to help with the stretching and strengthening of the low back as well as the ankle and foot.  I will also prescribe her naproxen 500 mg twice daily which will help her with the acute pain and inflammation that she is experiencing.  She will come back and see me in 6 weeks for repeat evaluation.  The patient expressed understanding of her diagnosis and treatment plan and all questions were answered.\par \par This note was generated using dragon medical dictation software.  A reasonable effort has been made for proofreading its contents, but typos may still remain.  If there are any questions or points of clarification needed please notify my office.

## 2023-05-09 ENCOUNTER — APPOINTMENT (OUTPATIENT)
Dept: OBGYN | Facility: CLINIC | Age: 43
End: 2023-05-09
Payer: COMMERCIAL

## 2023-05-09 VITALS
DIASTOLIC BLOOD PRESSURE: 82 MMHG | HEIGHT: 59 IN | HEART RATE: 70 BPM | WEIGHT: 130.6 LBS | BODY MASS INDEX: 26.33 KG/M2 | SYSTOLIC BLOOD PRESSURE: 121 MMHG

## 2023-05-09 PROCEDURE — 99396 PREV VISIT EST AGE 40-64: CPT

## 2023-05-09 NOTE — DISCUSSION/SUMMARY
[FreeTextEntry1] : thin prep and HPV ordered\par referral given for mammogram.\par rto in 1 year or soon for any gyn issues.\par pt verbalized understanding.\par

## 2023-05-09 NOTE — HISTORY OF PRESENT ILLNESS
[Patient reported PAP Smear was normal] : Patient reported PAP Smear was normal [Normal Amount/Duration] :  normal amount and duration [Patient refuses STI testing] : Patient refuses STI testing [FreeTextEntry1] : 43 y/o  female, LMP: 23, presents for annual GYN.  Patient denies any GYN complaints. \par \par Menstrual Cycle: q 28-35 days, monthly, mild pain and bleeding.\par Sexual Activity: monogamous/\par Contraception:  had vasectomy\par Social/Mental Health: denies any depression, suicidal ideation or any thoughts of personal harm.\par \par ROS: Denies fever/chills, HA, Cough/sore throat, CP, SOB, N/V, Diarrhea/Constipation, Pelvic pain,\par Urinary frequency/urgency/incontinence, irregular vaginal bleeding, discharge or irritation. \par \par Medical History\par OBHX: \par GYN HX: 1 , 1 c/section, left ovarian cyst\par PMH: HLD\par PSH: 1 c/section\par Meds: none\par Allergies:NKDA\par Social HX: social ETOH use\par Fam Hx: sister: h/o benign breast tumors \par  [PapSmeardate] : 2/22/2022

## 2023-05-12 LAB — HPV HIGH+LOW RISK DNA PNL CVX: NOT DETECTED

## 2023-05-15 LAB — CYTOLOGY CVX/VAG DOC THIN PREP: NORMAL

## 2023-05-18 ENCOUNTER — RESULT REVIEW (OUTPATIENT)
Age: 43
End: 2023-05-18

## 2023-05-18 ENCOUNTER — OUTPATIENT (OUTPATIENT)
Dept: OUTPATIENT SERVICES | Facility: HOSPITAL | Age: 43
LOS: 1 days | End: 2023-05-18
Payer: COMMERCIAL

## 2023-05-18 ENCOUNTER — APPOINTMENT (OUTPATIENT)
Dept: MAMMOGRAPHY | Facility: CLINIC | Age: 43
End: 2023-05-18
Payer: COMMERCIAL

## 2023-05-18 DIAGNOSIS — Z00.00 ENCOUNTER FOR GENERAL ADULT MEDICAL EXAMINATION WITHOUT ABNORMAL FINDINGS: ICD-10-CM

## 2023-05-18 PROCEDURE — 77067 SCR MAMMO BI INCL CAD: CPT | Mod: 26

## 2023-05-18 PROCEDURE — 77067 SCR MAMMO BI INCL CAD: CPT

## 2023-05-18 PROCEDURE — 77063 BREAST TOMOSYNTHESIS BI: CPT

## 2023-05-18 PROCEDURE — 77063 BREAST TOMOSYNTHESIS BI: CPT | Mod: 26

## 2023-05-22 ENCOUNTER — RESULT REVIEW (OUTPATIENT)
Age: 43
End: 2023-05-22

## 2023-05-25 ENCOUNTER — APPOINTMENT (OUTPATIENT)
Dept: MAMMOGRAPHY | Facility: CLINIC | Age: 43
End: 2023-05-25
Payer: COMMERCIAL

## 2023-05-25 ENCOUNTER — OUTPATIENT (OUTPATIENT)
Dept: OUTPATIENT SERVICES | Facility: HOSPITAL | Age: 43
LOS: 1 days | End: 2023-05-25
Payer: COMMERCIAL

## 2023-05-25 ENCOUNTER — RESULT REVIEW (OUTPATIENT)
Age: 43
End: 2023-05-25

## 2023-05-25 DIAGNOSIS — R92.2 INCONCLUSIVE MAMMOGRAM: ICD-10-CM

## 2023-05-25 PROCEDURE — 77065 DX MAMMO INCL CAD UNI: CPT | Mod: 26,LT

## 2023-05-25 PROCEDURE — 77065 DX MAMMO INCL CAD UNI: CPT

## 2023-06-02 ENCOUNTER — NON-APPOINTMENT (OUTPATIENT)
Age: 43
End: 2023-06-02

## 2023-06-19 ENCOUNTER — APPOINTMENT (OUTPATIENT)
Dept: ORTHOPEDIC SURGERY | Facility: CLINIC | Age: 43
End: 2023-06-19
Payer: COMMERCIAL

## 2023-06-19 VITALS — SYSTOLIC BLOOD PRESSURE: 122 MMHG | HEART RATE: 57 BPM | DIASTOLIC BLOOD PRESSURE: 78 MMHG

## 2023-06-19 PROCEDURE — 99213 OFFICE O/P EST LOW 20 MIN: CPT

## 2023-06-19 NOTE — HISTORY OF PRESENT ILLNESS
[de-identified] : CECI AGUILAR  is a 42 year year old F who presents for follow-up of right-sided low back pain as well as right ankle pain and toe numbness.  She reports that her back and radiating pain is improved since last visit.  She says that about 75% improved.  She has been doing physical therapy with relief of the pain.  She still does experience the symptoms and they do limit her ability on a daily basis.  She also feels some weakness in the leg.  She still has radiating pain and numbness down her leg.

## 2023-06-19 NOTE — PHYSICAL EXAM
[de-identified] : General: No apparent distress\par Cardiovascular: extremities warm and well-perfused, no cyanosis\par Pulmonary: non labored respirations\par \par Musculoskeletal:\par \par Lumbar Spine:\par Flexion/Extension: Able to flex and extend at waist without significant pain\par Straight Leg Raise: Positive\par LE Motor Exam: 5/5 hip flexors/quad/EHL/TA/GS\par Sensory: SILT throughout all distributions [de-identified] : MRI of the lumbar spine performed in outside facility on 5/11/2023 demonstrates broad-based central/right paracentral disc herniation at L4-5 with L5 nerve root impingement, small broad-based central disc herniation and associated peripheral fissure at L5-S1

## 2023-06-19 NOTE — DISCUSSION/SUMMARY
[de-identified] : Lumbar disc herniation.  Discussed with her that she does have evidence of nerve impingement in the lumbar spine.  Discussed that lumbar disc herniations can often be treated nonoperatively.  I recommend continuing physical therapy and an epidural steroid injection.  She says that she is working with the pain management doctor to schedule the steroid injection.  Discussed with her that if she continues to have pain after the injection and finishing the physical therapy, we may consider surgical intervention which would be a discectomy.  Discussed that this would be if she continues to have significant pain after nonoperative treatment.  For now, we will continue with the PT and have the epidural steroid injection performed.  She will come back and see me 6 weeks after the injection.  The patient expressed understanding of her diagnosis and treatment plan and all questions were answered.\par \par This note was generated using dragon medical dictation software.  A reasonable effort has been made for proofreading its contents, but typos may still remain.  If there are any questions or points of clarification needed please notify my office.

## 2023-08-07 ENCOUNTER — APPOINTMENT (OUTPATIENT)
Dept: INTERNAL MEDICINE | Facility: CLINIC | Age: 43
End: 2023-08-07
Payer: COMMERCIAL

## 2023-08-07 VITALS
OXYGEN SATURATION: 99 % | TEMPERATURE: 97.2 F | HEART RATE: 59 BPM | BODY MASS INDEX: 17.07 KG/M2 | DIASTOLIC BLOOD PRESSURE: 78 MMHG | SYSTOLIC BLOOD PRESSURE: 115 MMHG | RESPIRATION RATE: 16 BRPM | WEIGHT: 126 LBS | HEIGHT: 72 IN

## 2023-08-07 DIAGNOSIS — Z91.018 ALLERGY TO OTHER FOODS: ICD-10-CM

## 2023-08-07 DIAGNOSIS — N92.6 IRREGULAR MENSTRUATION, UNSPECIFIED: ICD-10-CM

## 2023-08-07 PROCEDURE — 36415 COLL VENOUS BLD VENIPUNCTURE: CPT

## 2023-08-07 PROCEDURE — 99213 OFFICE O/P EST LOW 20 MIN: CPT | Mod: 25

## 2023-08-07 NOTE — HISTORY OF PRESENT ILLNESS
[FreeTextEntry1] : Food allergies Irregular menses [de-identified] : -Developed new onset sensitivity/allergies to certain vegetables and fruits that she normally eats States has lip numbness/erythema of skin It resolves w/ antihistamines -This past month had 8 days of bleeding w/ her menses, resolved, and then bled again Unsure if she could be pregnant

## 2023-08-07 NOTE — PLAN
[FreeTextEntry1] : Completed labs in office today, will await results and notify patient accordingly -Referral to allergist -Await labs, if unremarkable advised to see gyn

## 2023-08-08 LAB
HCG SERPL-MCNC: <1 MIU/ML
TSH SERPL-ACNC: 0.95 UIU/ML

## 2023-08-21 ENCOUNTER — APPOINTMENT (OUTPATIENT)
Dept: OBGYN | Facility: CLINIC | Age: 43
End: 2023-08-21
Payer: COMMERCIAL

## 2023-08-21 VITALS
HEIGHT: 59 IN | SYSTOLIC BLOOD PRESSURE: 122 MMHG | DIASTOLIC BLOOD PRESSURE: 79 MMHG | WEIGHT: 128 LBS | BODY MASS INDEX: 25.8 KG/M2

## 2023-08-21 DIAGNOSIS — N92.6 IRREGULAR MENSTRUATION, UNSPECIFIED: ICD-10-CM

## 2023-08-21 DIAGNOSIS — B37.31 ACUTE CANDIDIASIS OF VULVA AND VAGINA: ICD-10-CM

## 2023-08-21 LAB — HCG UR QL: NEGATIVE

## 2023-08-21 PROCEDURE — 99212 OFFICE O/P EST SF 10 MIN: CPT

## 2023-08-21 PROCEDURE — 81025 URINE PREGNANCY TEST: CPT

## 2023-08-22 PROBLEM — B37.31 VAGINAL YEAST INFECTION: Status: ACTIVE | Noted: 2023-08-22 | Resolved: 2023-09-21

## 2023-08-22 LAB
C TRACH RRNA SPEC QL NAA+PROBE: NOT DETECTED
CANDIDA VAG CYTO: DETECTED
G VAGINALIS+PREV SP MTYP VAG QL MICRO: NOT DETECTED
N GONORRHOEA RRNA SPEC QL NAA+PROBE: NOT DETECTED
SOURCE AMPLIFICATION: NORMAL
T VAGINALIS VAG QL WET PREP: NOT DETECTED

## 2023-08-22 RX ORDER — FLUCONAZOLE 150 MG/1
150 TABLET ORAL
Qty: 2 | Refills: 0 | Status: ACTIVE | COMMUNITY
Start: 2023-08-22 | End: 1900-01-01

## 2023-08-22 NOTE — DISCUSSION/SUMMARY
[FreeTextEntry1] : BV panel and gc/ct ordered tvus ucg neg. f/u after cults and imaging complete. GYN counseling: Patient instructed to call for Unusual Pelvic pain, UTI symptoms, irregular vaginal bleeding/discharge- Patient verbalized agreement

## 2023-08-22 NOTE — CHIEF COMPLAINT
[Urgent Visit] : Urgent Visit [FreeTextEntry1] : 43 y/o female , LMP: 23 presents for an urgent visit c/o irreg vag bleeding.  had vb  on  for 8 days and then again began vb on 23 last 5 days.  Went PMD around this time and preg test done was neg.  ROS:  Denies fever/chills, HA, Cough/sore throat, CP, SOB, N/V, Diarrhea/Constipation, Pelvic pain, dysuria, urinary urgency and burning, irregular vaginal bleeding, discharge or irritation.

## 2023-08-24 ENCOUNTER — APPOINTMENT (OUTPATIENT)
Dept: OBGYN | Facility: CLINIC | Age: 43
End: 2023-08-24
Payer: COMMERCIAL

## 2023-08-24 ENCOUNTER — ASOB RESULT (OUTPATIENT)
Age: 43
End: 2023-08-24

## 2023-08-24 PROCEDURE — 76830 TRANSVAGINAL US NON-OB: CPT

## 2023-09-21 ENCOUNTER — TRANSCRIPTION ENCOUNTER (OUTPATIENT)
Age: 43
End: 2023-09-21

## 2023-09-22 ENCOUNTER — TRANSCRIPTION ENCOUNTER (OUTPATIENT)
Age: 43
End: 2023-09-22

## 2023-10-24 ENCOUNTER — APPOINTMENT (OUTPATIENT)
Dept: OBGYN | Facility: CLINIC | Age: 43
End: 2023-10-24
Payer: COMMERCIAL

## 2023-10-24 ENCOUNTER — ASOB RESULT (OUTPATIENT)
Age: 43
End: 2023-10-24

## 2023-10-24 PROCEDURE — 76830 TRANSVAGINAL US NON-OB: CPT

## 2023-11-03 ENCOUNTER — RESULT REVIEW (OUTPATIENT)
Age: 43
End: 2023-11-03

## 2023-11-03 DIAGNOSIS — R92.30 DENSE BREASTS, UNSPECIFIED: ICD-10-CM

## 2023-11-27 ENCOUNTER — APPOINTMENT (OUTPATIENT)
Dept: MAMMOGRAPHY | Facility: CLINIC | Age: 43
End: 2023-11-27
Payer: COMMERCIAL

## 2023-11-27 ENCOUNTER — OUTPATIENT (OUTPATIENT)
Dept: OUTPATIENT SERVICES | Facility: HOSPITAL | Age: 43
LOS: 1 days | End: 2023-11-27
Payer: COMMERCIAL

## 2023-11-27 ENCOUNTER — RESULT REVIEW (OUTPATIENT)
Age: 43
End: 2023-11-27

## 2023-11-27 DIAGNOSIS — Z00.8 ENCOUNTER FOR OTHER GENERAL EXAMINATION: ICD-10-CM

## 2023-11-27 PROCEDURE — 77065 DX MAMMO INCL CAD UNI: CPT

## 2023-11-27 PROCEDURE — 77065 DX MAMMO INCL CAD UNI: CPT | Mod: 26,LT

## 2023-12-27 ENCOUNTER — APPOINTMENT (OUTPATIENT)
Dept: INTERNAL MEDICINE | Facility: CLINIC | Age: 43
End: 2023-12-27
Payer: COMMERCIAL

## 2023-12-27 ENCOUNTER — TRANSCRIPTION ENCOUNTER (OUTPATIENT)
Age: 43
End: 2023-12-27

## 2023-12-27 VITALS
RESPIRATION RATE: 16 BRPM | SYSTOLIC BLOOD PRESSURE: 112 MMHG | TEMPERATURE: 98.2 F | BODY MASS INDEX: 26 KG/M2 | WEIGHT: 129 LBS | HEART RATE: 78 BPM | HEIGHT: 59 IN | DIASTOLIC BLOOD PRESSURE: 69 MMHG | OXYGEN SATURATION: 96 %

## 2023-12-27 DIAGNOSIS — J10.1 INFLUENZA DUE TO OTHER IDENTIFIED INFLUENZA VIRUS WITH OTHER RESPIRATORY MANIFESTATIONS: ICD-10-CM

## 2023-12-27 DIAGNOSIS — R68.89 OTHER GENERAL SYMPTOMS AND SIGNS: ICD-10-CM

## 2023-12-27 LAB
FLUAV SPEC QL CULT: POSITIVE
FLUBV AG SPEC QL IA: NEGATIVE

## 2023-12-27 PROCEDURE — 99213 OFFICE O/P EST LOW 20 MIN: CPT | Mod: 25

## 2023-12-27 PROCEDURE — 87804 INFLUENZA ASSAY W/OPTIC: CPT | Mod: QW

## 2023-12-27 RX ORDER — PROMETHAZINE HYDROCHLORIDE AND DEXTROMETHORPHAN HYDROBROMIDE ORAL SOLUTION 15; 6.25 MG/5ML; MG/5ML
6.25-15 SOLUTION ORAL
Qty: 1 | Refills: 0 | Status: ACTIVE | COMMUNITY
Start: 2023-12-27 | End: 1900-01-01

## 2023-12-27 RX ORDER — OSELTAMIVIR PHOSPHATE 75 MG/1
75 CAPSULE ORAL TWICE DAILY
Qty: 10 | Refills: 0 | Status: ACTIVE | COMMUNITY
Start: 2023-12-27 | End: 1900-01-01

## 2023-12-27 NOTE — PLAN
[FreeTextEntry1] : -Start tamiflu, SE profile of medicaiton reviewed -Cough suppressent, NSAID/tylenol alternating for fevers/bodyaches, rest, hydration

## 2023-12-27 NOTE — PHYSICAL EXAM
[Normal Oropharynx] : the oropharynx was normal [Normal] : normal rate, regular rhythm, normal S1 and S2 and no murmur heard [de-identified] : Appears fatigued

## 2023-12-27 NOTE — HISTORY OF PRESENT ILLNESS
[FreeTextEntry8] : Fever, body aches  Complaining of low grade fevers, chills, body aches with cough.  Symptoms started Sunday/Monday. Denies CP or SOB. COVID home test negative

## 2023-12-28 ENCOUNTER — APPOINTMENT (OUTPATIENT)
Dept: INTERNAL MEDICINE | Facility: CLINIC | Age: 43
End: 2023-12-28

## 2023-12-31 ENCOUNTER — EMERGENCY (EMERGENCY)
Facility: HOSPITAL | Age: 43
LOS: 0 days | Discharge: HOME | End: 2024-01-01
Attending: EMERGENCY MEDICINE
Payer: COMMERCIAL

## 2023-12-31 VITALS
TEMPERATURE: 98 F | DIASTOLIC BLOOD PRESSURE: 84 MMHG | HEIGHT: 59 IN | RESPIRATION RATE: 18 BRPM | HEART RATE: 62 BPM | SYSTOLIC BLOOD PRESSURE: 121 MMHG | WEIGHT: 130.07 LBS | OXYGEN SATURATION: 97 %

## 2023-12-31 VITALS
TEMPERATURE: 98 F | RESPIRATION RATE: 18 BRPM | DIASTOLIC BLOOD PRESSURE: 74 MMHG | OXYGEN SATURATION: 97 % | SYSTOLIC BLOOD PRESSURE: 117 MMHG | HEART RATE: 69 BPM

## 2023-12-31 DIAGNOSIS — R50.9 FEVER, UNSPECIFIED: ICD-10-CM

## 2023-12-31 DIAGNOSIS — Z20.822 CONTACT WITH AND (SUSPECTED) EXPOSURE TO COVID-19: ICD-10-CM

## 2023-12-31 DIAGNOSIS — J11.1 INFLUENZA DUE TO UNIDENTIFIED INFLUENZA VIRUS WITH OTHER RESPIRATORY MANIFESTATIONS: ICD-10-CM

## 2023-12-31 DIAGNOSIS — R09.81 NASAL CONGESTION: ICD-10-CM

## 2023-12-31 DIAGNOSIS — R05.9 COUGH, UNSPECIFIED: ICD-10-CM

## 2023-12-31 LAB
ALBUMIN SERPL ELPH-MCNC: 3.2 G/DL — LOW (ref 3.3–5)
ALBUMIN SERPL ELPH-MCNC: 3.2 G/DL — LOW (ref 3.3–5)
ALP SERPL-CCNC: 67 U/L — SIGNIFICANT CHANGE UP (ref 40–120)
ALP SERPL-CCNC: 67 U/L — SIGNIFICANT CHANGE UP (ref 40–120)
ALT FLD-CCNC: 54 U/L — SIGNIFICANT CHANGE UP (ref 12–78)
ALT FLD-CCNC: 54 U/L — SIGNIFICANT CHANGE UP (ref 12–78)
ANION GAP SERPL CALC-SCNC: 3 MMOL/L — LOW (ref 5–17)
ANION GAP SERPL CALC-SCNC: 3 MMOL/L — LOW (ref 5–17)
AST SERPL-CCNC: 27 U/L — SIGNIFICANT CHANGE UP (ref 15–37)
AST SERPL-CCNC: 27 U/L — SIGNIFICANT CHANGE UP (ref 15–37)
BASOPHILS # BLD AUTO: 0.02 K/UL — SIGNIFICANT CHANGE UP (ref 0–0.2)
BASOPHILS # BLD AUTO: 0.02 K/UL — SIGNIFICANT CHANGE UP (ref 0–0.2)
BASOPHILS NFR BLD AUTO: 0.2 % — SIGNIFICANT CHANGE UP (ref 0–2)
BASOPHILS NFR BLD AUTO: 0.2 % — SIGNIFICANT CHANGE UP (ref 0–2)
BILIRUB SERPL-MCNC: 0.2 MG/DL — SIGNIFICANT CHANGE UP (ref 0.2–1.2)
BILIRUB SERPL-MCNC: 0.2 MG/DL — SIGNIFICANT CHANGE UP (ref 0.2–1.2)
BUN SERPL-MCNC: 9 MG/DL — SIGNIFICANT CHANGE UP (ref 7–23)
BUN SERPL-MCNC: 9 MG/DL — SIGNIFICANT CHANGE UP (ref 7–23)
CALCIUM SERPL-MCNC: 8.6 MG/DL — SIGNIFICANT CHANGE UP (ref 8.5–10.1)
CALCIUM SERPL-MCNC: 8.6 MG/DL — SIGNIFICANT CHANGE UP (ref 8.5–10.1)
CHLORIDE SERPL-SCNC: 110 MMOL/L — HIGH (ref 96–108)
CHLORIDE SERPL-SCNC: 110 MMOL/L — HIGH (ref 96–108)
CO2 SERPL-SCNC: 24 MMOL/L — SIGNIFICANT CHANGE UP (ref 22–31)
CO2 SERPL-SCNC: 24 MMOL/L — SIGNIFICANT CHANGE UP (ref 22–31)
CREAT SERPL-MCNC: 0.6 MG/DL — SIGNIFICANT CHANGE UP (ref 0.5–1.3)
CREAT SERPL-MCNC: 0.6 MG/DL — SIGNIFICANT CHANGE UP (ref 0.5–1.3)
EGFR: 116 ML/MIN/1.73M2 — SIGNIFICANT CHANGE UP
EGFR: 116 ML/MIN/1.73M2 — SIGNIFICANT CHANGE UP
EOSINOPHIL # BLD AUTO: 0.01 K/UL — SIGNIFICANT CHANGE UP (ref 0–0.5)
EOSINOPHIL # BLD AUTO: 0.01 K/UL — SIGNIFICANT CHANGE UP (ref 0–0.5)
EOSINOPHIL NFR BLD AUTO: 0.1 % — SIGNIFICANT CHANGE UP (ref 0–6)
EOSINOPHIL NFR BLD AUTO: 0.1 % — SIGNIFICANT CHANGE UP (ref 0–6)
GLUCOSE SERPL-MCNC: 115 MG/DL — HIGH (ref 70–99)
GLUCOSE SERPL-MCNC: 115 MG/DL — HIGH (ref 70–99)
HCG SERPL-ACNC: <1 MIU/ML — SIGNIFICANT CHANGE UP
HCG SERPL-ACNC: <1 MIU/ML — SIGNIFICANT CHANGE UP
HCT VFR BLD CALC: 40 % — SIGNIFICANT CHANGE UP (ref 34.5–45)
HCT VFR BLD CALC: 40 % — SIGNIFICANT CHANGE UP (ref 34.5–45)
HGB BLD-MCNC: 13.3 G/DL — SIGNIFICANT CHANGE UP (ref 11.5–15.5)
HGB BLD-MCNC: 13.3 G/DL — SIGNIFICANT CHANGE UP (ref 11.5–15.5)
IMM GRANULOCYTES NFR BLD AUTO: 0.3 % — SIGNIFICANT CHANGE UP (ref 0–0.9)
IMM GRANULOCYTES NFR BLD AUTO: 0.3 % — SIGNIFICANT CHANGE UP (ref 0–0.9)
LYMPHOCYTES # BLD AUTO: 1.17 K/UL — SIGNIFICANT CHANGE UP (ref 1–3.3)
LYMPHOCYTES # BLD AUTO: 1.17 K/UL — SIGNIFICANT CHANGE UP (ref 1–3.3)
LYMPHOCYTES # BLD AUTO: 11.2 % — LOW (ref 13–44)
LYMPHOCYTES # BLD AUTO: 11.2 % — LOW (ref 13–44)
MCHC RBC-ENTMCNC: 29.1 PG — SIGNIFICANT CHANGE UP (ref 27–34)
MCHC RBC-ENTMCNC: 29.1 PG — SIGNIFICANT CHANGE UP (ref 27–34)
MCHC RBC-ENTMCNC: 33.3 G/DL — SIGNIFICANT CHANGE UP (ref 32–36)
MCHC RBC-ENTMCNC: 33.3 G/DL — SIGNIFICANT CHANGE UP (ref 32–36)
MCV RBC AUTO: 87.5 FL — SIGNIFICANT CHANGE UP (ref 80–100)
MCV RBC AUTO: 87.5 FL — SIGNIFICANT CHANGE UP (ref 80–100)
MONOCYTES # BLD AUTO: 0.17 K/UL — SIGNIFICANT CHANGE UP (ref 0–0.9)
MONOCYTES # BLD AUTO: 0.17 K/UL — SIGNIFICANT CHANGE UP (ref 0–0.9)
MONOCYTES NFR BLD AUTO: 1.6 % — LOW (ref 2–14)
MONOCYTES NFR BLD AUTO: 1.6 % — LOW (ref 2–14)
NEUTROPHILS # BLD AUTO: 9.04 K/UL — HIGH (ref 1.8–7.4)
NEUTROPHILS # BLD AUTO: 9.04 K/UL — HIGH (ref 1.8–7.4)
NEUTROPHILS NFR BLD AUTO: 86.6 % — HIGH (ref 43–77)
NEUTROPHILS NFR BLD AUTO: 86.6 % — HIGH (ref 43–77)
NRBC # BLD: 0 /100 WBCS — SIGNIFICANT CHANGE UP (ref 0–0)
NRBC # BLD: 0 /100 WBCS — SIGNIFICANT CHANGE UP (ref 0–0)
PLATELET # BLD AUTO: 240 K/UL — SIGNIFICANT CHANGE UP (ref 150–400)
PLATELET # BLD AUTO: 240 K/UL — SIGNIFICANT CHANGE UP (ref 150–400)
POTASSIUM SERPL-MCNC: 4.1 MMOL/L — SIGNIFICANT CHANGE UP (ref 3.5–5.3)
POTASSIUM SERPL-MCNC: 4.1 MMOL/L — SIGNIFICANT CHANGE UP (ref 3.5–5.3)
POTASSIUM SERPL-SCNC: 4.1 MMOL/L — SIGNIFICANT CHANGE UP (ref 3.5–5.3)
POTASSIUM SERPL-SCNC: 4.1 MMOL/L — SIGNIFICANT CHANGE UP (ref 3.5–5.3)
PROT SERPL-MCNC: 7.4 GM/DL — SIGNIFICANT CHANGE UP (ref 6–8.3)
PROT SERPL-MCNC: 7.4 GM/DL — SIGNIFICANT CHANGE UP (ref 6–8.3)
RBC # BLD: 4.57 M/UL — SIGNIFICANT CHANGE UP (ref 3.8–5.2)
RBC # BLD: 4.57 M/UL — SIGNIFICANT CHANGE UP (ref 3.8–5.2)
RBC # FLD: 12.6 % — SIGNIFICANT CHANGE UP (ref 10.3–14.5)
RBC # FLD: 12.6 % — SIGNIFICANT CHANGE UP (ref 10.3–14.5)
SODIUM SERPL-SCNC: 137 MMOL/L — SIGNIFICANT CHANGE UP (ref 135–145)
SODIUM SERPL-SCNC: 137 MMOL/L — SIGNIFICANT CHANGE UP (ref 135–145)
WBC # BLD: 10.44 K/UL — SIGNIFICANT CHANGE UP (ref 3.8–10.5)
WBC # BLD: 10.44 K/UL — SIGNIFICANT CHANGE UP (ref 3.8–10.5)
WBC # FLD AUTO: 10.44 K/UL — SIGNIFICANT CHANGE UP (ref 3.8–10.5)
WBC # FLD AUTO: 10.44 K/UL — SIGNIFICANT CHANGE UP (ref 3.8–10.5)

## 2023-12-31 PROCEDURE — 71045 X-RAY EXAM CHEST 1 VIEW: CPT | Mod: 26

## 2023-12-31 PROCEDURE — 70491 CT SOFT TISSUE NECK W/DYE: CPT | Mod: 26,MA

## 2023-12-31 PROCEDURE — 99285 EMERGENCY DEPT VISIT HI MDM: CPT

## 2023-12-31 RX ORDER — BENZOCAINE AND MENTHOL 5; 1 G/100ML; G/100ML
1 LIQUID ORAL ONCE
Refills: 0 | Status: COMPLETED | OUTPATIENT
Start: 2023-12-31 | End: 2023-12-31

## 2023-12-31 RX ORDER — BENZOCAINE AND MENTHOL 5; 1 G/100ML; G/100ML
1 LIQUID ORAL
Refills: 0
Start: 2023-12-31 | End: 2024-01-04

## 2023-12-31 RX ADMIN — BENZOCAINE AND MENTHOL 1 LOZENGE: 5; 1 LIQUID ORAL at 21:33

## 2023-12-31 RX ADMIN — Medication 200 MILLIGRAM(S): at 21:32

## 2023-12-31 NOTE — ED ADULT NURSE NOTE - CAS TRG GEN SKIN CONDITION
7/24/2017       RE: Sabina Figueroa  [address]  [place] [address]     Dear Colleague,    Thank you for referring your patient, Sabina Figueroa, to the [facility] at [facility]. Please see a copy of my visit note below.    Reason for Visit  Sabina Figueroa is a 44 year old female who is seen for follow up of recurrent respiratory failure  Pulmonary HPI  Since our last visit, Sabina was again hospitalized, July 5-8 at [place], then again July 12 - 21 also at [place] the second time she reportedly had pneumonia. She was discharged on 4/1/17. She has been on prednisone 60mg since 3/22/17. She is sleepy again today, reports medications. She has completed substance abuse treatment and after care. She is taking Bactrim three times a week. She was discharged prematurely due to missing a lot of treatment time while hospitalized. She is smoking 1.5 ppd. Bronchoscopy done at [place], no growth on culture but she reports being told she had pneumonia.     The patient was seen and examined by Jennifer Mane MD   Current Outpatient Prescriptions   Medication     predniSONE (DELTASONE) 1 MG tablet     insulin glargine (LANTUS) 100 UNIT/ML injection     insulin aspart (NOVOLOG PEN) 100 UNIT/ML injection     insulin aspart (NOVOLOG PEN) 100 UNIT/ML injection     insulin aspart (NOVOLOG PEN) 100 UNIT/ML injection     spironolactone (ALDACTONE) 50 MG tablet     gabapentin (NEURONTIN) 600 MG tablet     sulfamethoxazole-trimethoprim (BACTRIM DS/SEPTRA DS) 800-160 MG per tablet     blood glucose monitoring (ACCU-CHEK TONIA PLUS) meter device kit     ascorbic acid (VITAMIN C) 500 MG tablet     FLUoxetine (PROZAC) 40 MG capsule     furosemide (LASIX) 20 MG tablet     hydrOXYzine (ATARAX) 10 MG tablet     lactulose (CHRONULAC) 10 GM/15ML solution     omeprazole (PRILOSEC) 20 MG CR capsule     thiamine 100 MG tablet     traZODone (DESYREL) 100 MG tablet      Omega-3 Fatty Acids (FISH OIL) 1200 MG CAPS     methocarbamol (ROBAXIN) 500 MG tablet     aspirin EC 81 MG EC tablet     atorvastatin (LIPITOR) 20 MG tablet     rifaximin (XIFAXAN) 550 MG TABS tablet     ferrous sulfate (IRON) 325 (65 FE) MG tablet     albuterol (2.5 MG/3ML) 0.083% neb solution     buprenorphine-naloxone (SUBOXONE) 8-2 MG SUBL sublingual tablet     alum & mag hydroxide-simethicone (MYLANTA ES/MAALOX  ES) 400-400-40 MG/5ML SUSP     albuterol (PROAIR HFA, PROVENTIL HFA, VENTOLIN HFA) 108 (90 BASE) MCG/ACT inhaler     MULTIPLE VITAMINS PO     folic acid (FOLVITE) 1 MG tablet     No current facility-administered medications for this visit.      Allergies   Allergen Reactions     No Clinical Screening - See Comments      Bupronide patch allergy     Social History     Social History     Marital status: Single     Spouse name: N/A     Number of children: N/A     Years of education: N/A     Occupational History     Not on file.     Social History Main Topics     Smoking status: Current Every Day Smoker     Packs/day: 1.50     Years: 18.00     Types: Cigarettes     Smokeless tobacco: Former User     Quit date: 5/11/2016     Alcohol use 0.0 oz/week     0 Standard drinks or equivalent per week      Comment: 8 drinks or more each day, abstinent since 4/2/15. History of CD treatment in past x1     Drug use: No     Sexual activity: Not on file     Other Topics Concern     Not on file     Social History Narrative    Denies illicit IV or intranasal drug use    No tattoos or piercings     Past Medical History:   Diagnosis Date     ALC (alcoholic liver cirrhosis) (H)      Alcohol abuse      Anxiety      Arthritis      Ascites      Asthma      Bunion, left foot      Chronic low back pain     Narcotic agreement signed in [facility] system     Depression      Hypertension     current     Ovarian cyst, left      Past Surgical History:   Procedure Laterality Date     BRONCHOSCOPY FLEXIBLE N/A 1/10/2017    Procedure:  BRONCHOSCOPY FLEXIBLE;  Surgeon: Jennifer Mane MD;  Location:  GI     BRONCHOSCOPY FLEXIBLE N/A 2017    Procedure: BRONCHOSCOPY FLEXIBLE;  Surgeon: Jennifer Mane MD;  Location: [facility] OR      SECTION       ESOPHAGOSCOPY, GASTROSCOPY, DUODENOSCOPY (EGD), COMBINED  2014    Procedure: COMBINED ESOPHAGOSCOPY, GASTROSCOPY, DUODENOSCOPY (EGD);  Surgeon: Brittany Lowe MD;  Location: [facility] GI     ESOPHAGOSCOPY, GASTROSCOPY, DUODENOSCOPY (EGD), COMBINED N/A 2015    Procedure: COMBINED ESOPHAGOSCOPY, GASTROSCOPY, DUODENOSCOPY (EGD);  Surgeon: London Lenz MD;  Location:  GI     ESOPHAGOSCOPY, GASTROSCOPY, DUODENOSCOPY (EGD), COMBINED N/A 2015    Procedure: COMBINED ESOPHAGOSCOPY, GASTROSCOPY, DUODENOSCOPY (EGD);  Surgeon: Barry Chavez MD;  Location:  GI     KNEE SURGERY      x3     Family History   Problem Relation Age of Onset     Depression Mother      Anxiety Disorder Mother      MENTAL ILLNESS Mother      Substance Abuse Mother      Depression Father      Anxiety Disorder Father      Substance Abuse Father      MENTAL ILLNESS Father      Depression Daughter      Depression Brother      Schizophrenia Brother      Depression Brother      Anxiety Disorder Brother      Substance Abuse Brother      ROS Pulmonary  Dyspnea: No, Cough: No, Chest pain: No, Wheezing: No, Sputum Production: No, Hemoptysis: No  A complete ROS was otherwise negative except as noted in the HPI.  /70 (BP Location: Right arm, Patient Position: Chair, Cuff Size: Adult Large)  Pulse 72  Resp 16  Wt 85.3 kg (188 lb)  SpO2 (!) 88%  BMI 29.44 kg/m2  Exam:   GENERAL APPEARANCE: Well developed, well nourished, alert, and in no apparent distress.  EYES: PERRL, EOMI  HENT: Nasal mucosa with no edema and no hyperemia. No nasal polyps.  EARS: Canals clear, TMs normal  MOUTH: Oral mucosa is moist, without any lesions, no tonsillar enlargement, no oropharyngeal exudate.  NECK: supple, no masses, no  thyromegaly.  LYMPHATICS: No significant axillary, cervical, or supraclavicular nodes.  RESP: normal percussion, good air flow throughout.  No crackles. No rhonchi. Few inspiratory squeaks.  CV: Normal S1, S2, regular rhythm, normal rate. No murmur.  No rub. No gallop. No LE edema.   ABDOMEN:  Bowel sounds normal, soft, nontender, no HSM or masses.   MS: extremities normal. No clubbing. No cyanosis.  SKIN: no rash on limited exam  NEURO: Mentation intact, speech normal, normal strength and tone, normal gait and stance  PSYCH: mentation appears normal. and affect normal/bright  Results:  PFTs 3/31/17 mildly abnormal, nonspecific pattern  CXR today shows improved bilateral alveolar infiltrates    Assessment and plan: Sabina is a 44-year-old female who is a recovering alcoholic and opioid addict.  She has had multiple episodes of respiratory failure, with increasing severity and frequency over the last 9-12 months.  She had transbronchial lung biopsies after an episode of respiratory failure requiring endotracheal intubation in 01/2017 showing organizing pneumonia.      Organizing Pneumonia - improved after recent hospitalization precipitated by alcohol relapse. Her medications have been reviewed and do not appear to be contributory to this organizing pneumonia.     Continue prednisone, reduce from 60 to 40mg daily at the end of this month.  She is also taking Bactrim for Pneumocystis prophylaxis.  I have provided her a prednisone taper schedule.  I anticipate she will be on prednisone continuously for at least 6, possibly 12-month.     Nicotine Dependence - Her alcohol abstinence is too fragile to focus on smoking cessation now. Will continue to address.      I will plan to see her back in approximately 3 months with PFTs      Again, thank you for allowing me to participate in the care of your patient.      Sincerely,    Jennifer Mane MD       Warm/Dry

## 2023-12-31 NOTE — ED PROVIDER NOTE - PHYSICAL EXAMINATION
Vitals:   Gen: AAOx3, NAD, sitting comfortably in stretcher  Head: ncat, perrla, eomi b/l  ENT: + 2+R tonsillar hypertrophy without exudate, mild posterior oropharynx erythema, no significant uvular deviation  Neck: supple, + cervical lymphadenopathy, no midline deviation  Heart: rrr, no m/r/g  Lungs: CTA b/l, no rales/ronchi/wheezes  Abd: soft, nontender, non-distended, no rebound or guarding  Ext: no clubbing/cyanosis/edema  Neuro: sensation and muscle strength intact b/l, steady gait

## 2023-12-31 NOTE — ED PROVIDER NOTE - CLINICAL SUMMARY MEDICAL DECISION MAKING FREE TEXT BOX
44 yo F with fever, sore throat, unilateral posterior tonsillar swelling is concerning for PTA  -cbc, cmp, hcg, rvp, strep swab, CT neck soft tissue, XR chest for cough, although doubt pna, cepacol for throat pain, Robitussin for cough/congestion 42 yo F with fever, sore throat, unilateral posterior tonsillar swelling is concerning for PTA  -cbc, cmp, hcg, rvp, strep swab, CT neck soft tissue, XR chest for cough, although doubt pna, cepacol for throat pain, Robitussin for cough/congestion

## 2023-12-31 NOTE — ED PROVIDER NOTE - CARE PLAN
Spiritual Care Assessment/Progress Note  [facility]      NAME: Piedad Rowan      MRN: [id number]  AGE: 69 y.o.  SEX: female  Sabianism Affiliation: Zoroastrian   Language: English     5/13/2020     Total Time (in minutes): 11     Spiritual Assessment begun in [facility] INTENSIVE CARE through conversation with:         [x]Patient        [] Family    [] Friend(s)        Reason for Consult: Initial/Spiritual assessment, patient floor     Spiritual beliefs: (Please include comment if needed)     [x] Identifies with a juan tradition: Zoroastrian     [] Supported by a juan community:            [] Claims no spiritual orientation:           [] Seeking spiritual identity:                [] Adheres to an individual form of spirituality:           [] Not able to assess:                           Identified resources for coping:      [x] Prayer                               [] Music                  [] Guided Imagery     [x] Family/friends                 [] Pet visits     [] Devotional reading                         [] Unknown     [] Other:                                              Interventions offered during this visit: (See comments for more details)    Patient Interventions: Affirmation of emotions/emotional suffering, Affirmation of juan, Initial/Spiritual assessment, patient floor, Prayer (actual), Sabianism beliefs/image of God discussed, Iconic (affirming the presence of God/Higher Power)           Plan of Care:     [] Support spiritual and/or cultural needs    [] Support AMD and/or advance care planning process      [] Support grieving process   [] Coordinate Rites and/or Rituals    [] Coordination with community clergy   [] No spiritual needs identified at this time   [] Detailed Plan of Care below (See Comments)  [] Make referral to Music Therapy  [] Make referral to Pet Therapy     [] Make referral to Addiction services  [] Make referral to [facility]  [] Make referral to Spiritual Care Partner  [] No future visits requested        [x] Follow up visits as needed     Comments: Visit was at the ICU for Palliative Care initial spiritual assessment.  consulted with Mariam's nurse who indicated that she was able to hold a conversation.  called Pernell Bae and provided support by facilitating story telling. Pernell Bae stated that she was feeling better today, she indicated that her wonderful , of 44 years, was a great source of support, also her juan.  provided supportive presence and affirmation, encouraged focus on the present and facilitated theological reflection about hope and healing from the scriptures. Pernell Bae requested for prayer, which  provided. She was also informed of  availability as needed. Fiordaliza Contreras  for the call and prayer. Spiritual care is still available as needed. Visited by: Laure Cochran.   To samanta nascimento: [phone] 1 Principal Discharge DX:	Fever with sore throat  Secondary Diagnosis:	Influenza

## 2023-12-31 NOTE — ED ADULT NURSE NOTE - NSFALLUNIVINTERV_ED_ALL_ED
Bed/Stretcher in lowest position, wheels locked, appropriate side rails in place/Call bell, personal items and telephone in reach/Instruct patient to call for assistance before getting out of bed/chair/stretcher/Non-slip footwear applied when patient is off stretcher/[place] to call system/Physically safe environment - no spills, clutter or unnecessary equipment/Purposeful proactive rounding/Room/bathroom lighting operational, light cord in reach Bed/Stretcher in lowest position, wheels locked, appropriate side rails in place/Call bell, personal items and telephone in reach/Instruct patient to call for assistance before getting out of bed/chair/stretcher/Non-slip footwear applied when patient is off stretcher/[place] to call system/Physically safe environment - no spills, clutter or unnecessary equipment/Purposeful proactive rounding/Room/bathroom lighting operational, light cord in reach

## 2023-12-31 NOTE — ED PROVIDER NOTE - PATIENT PORTAL LINK FT
You can access the FollowMyHealth Patient Portal offered by [facility] by registering at the following website: [email or web address]. By joining [employer]’s FollowMyHealth portal, you will also be able to view your health information using other applications (apps) compatible with our system. You can access the FollowMyHealth Patient Portal offered by [facility] by registering at the following website: [email or web address]. By joining [employer]’s FollowMyHealth portal, you will also be able to view your health information using other applications (apps) compatible with our system.

## 2023-12-31 NOTE — ED ADULT NURSE NOTE - CHIEF COMPLAINT
Patient requests all Lab and Radiology Results on their Discharge Instructions
The patient is a 43y Female complaining of flu-like symptoms.

## 2023-12-31 NOTE — ED PROVIDER NOTE - OBJECTIVE STATEMENT
42 yo F with fever, chills, body aches, cough, congestion for 1 week, painful throat for 3 days.  Pt. tested + for flu exactly 1 week ago.  Pt. was referred by urgent care to ED to "r/o peritonsillar abscess."  Pt. has painful swallow, but tolerating PO intake.  No associated sob.  No other complaints. Toradol 30 mg and dex given at urgent care earlier today.   ROS: negative for headache, chest pain, shortness of breath, abd pain, nausea, vomiting, diarrhea, rash, paresthesia, and focal weakness--all other systems reviewed are negative.   PMH: Denies; Meds: Denies; SH: Denies smoking/drinking/drug use 44 yo F with fever, chills, body aches, cough, congestion for 1 week, painful throat for 3 days.  Pt. tested + for flu exactly 1 week ago.  Pt. was referred by urgent care to ED to "r/o peritonsillar abscess."  Pt. has painful swallow, but tolerating PO intake.  No associated sob.  No other complaints. Toradol 30 mg and dex given at urgent care earlier today.   ROS: negative for headache, chest pain, shortness of breath, abd pain, nausea, vomiting, diarrhea, rash, paresthesia, and focal weakness--all other systems reviewed are negative.   PMH: Denies; Meds: Denies; SH: Denies smoking/drinking/drug use

## 2023-12-31 NOTE — ED PROVIDER NOTE - PROGRESS NOTE DETAILS
Results reported to patient--grossly benign, xr clear, CT unremarkable, labs wnl  Pt. reports feeling better after meds  pt. agrees to f/u with primary care outpt., additional urgent ENT referral given for f/u   pt. understands to return to ED if symptoms worsen; will d/c with cepacol/robitussin

## 2023-12-31 NOTE — ED ADULT NURSE NOTE - FINAL NURSING ELECTRONIC SIGNATURE
November 17, 8326     Patient: Vera Plummer  YOB: 2008  Date of Visit: 11/17/2022      To Whom it May Concern:    Vera Plummer is under my professional care  Erikawhithenri Festus was seen in my office on 11/17/2022  Giovanny Festus may return to school on 11/21/2022  If you have any questions or concerns, please don't hesitate to call           Sincerely,          Dorothy King MD        CC: No Recipients 01-Jan-2024 00:56

## 2023-12-31 NOTE — ED ADULT TRIAGE NOTE - GLASGOW COMA SCALE: BEST MOTOR RESPONSE, MLM
1.  Start doxycycline and metronidazole - every 12 hours for 7 days.    2.  Use puddings and cereals - as needed to tolerate antibiotics.      3.  Use nasal saline and Nasonex spray - twice daily for the next week.    4.  Call the office in one week - with status report.    5.  Return April 12 - fasting checkup and PME.  
(M6) obeys commands

## 2023-12-31 NOTE — ED PROVIDER NOTE - CARE PROVIDER_API CALL
Troy Renteria  Otolaryngology  [address], [place] [address]  Phone: [phone]  Fax: [phone]  Follow Up Time: Urgent   Troy Renteria  Otolaryngology  [address], [place] [address]  Phone: [phone]  Fax: [phone]  Follow Up Time: Urgent

## 2024-01-01 LAB
S PYO DNA THROAT QL NAA+PROBE: SIGNIFICANT CHANGE UP
S PYO DNA THROAT QL NAA+PROBE: SIGNIFICANT CHANGE UP

## 2024-01-01 RX ORDER — BENZOCAINE AND MENTHOL 5; 1 G/100ML; G/100ML
1 LIQUID ORAL
Qty: 9 | Refills: 0
Start: 2024-01-01 | End: 2024-01-03

## 2024-01-02 ENCOUNTER — TRANSCRIPTION ENCOUNTER (OUTPATIENT)
Age: 44
End: 2024-01-02

## 2024-03-01 ENCOUNTER — APPOINTMENT (OUTPATIENT)
Dept: INTERNAL MEDICINE | Facility: CLINIC | Age: 44
End: 2024-03-01
Payer: COMMERCIAL

## 2024-03-01 VITALS
RESPIRATION RATE: 16 BRPM | OXYGEN SATURATION: 98 % | BODY MASS INDEX: 26.84 KG/M2 | HEART RATE: 73 BPM | TEMPERATURE: 98.5 F | SYSTOLIC BLOOD PRESSURE: 121 MMHG | HEIGHT: 59 IN | WEIGHT: 133.13 LBS | DIASTOLIC BLOOD PRESSURE: 61 MMHG

## 2024-03-01 DIAGNOSIS — H60.501 UNSPECIFIED ACUTE NONINFECTIVE OTITIS EXTERNA, RIGHT EAR: ICD-10-CM

## 2024-03-01 DIAGNOSIS — H60.11 CELLULITIS OF RIGHT EXTERNAL EAR: ICD-10-CM

## 2024-03-01 PROCEDURE — 99213 OFFICE O/P EST LOW 20 MIN: CPT

## 2024-03-01 PROCEDURE — G2211 COMPLEX E/M VISIT ADD ON: CPT

## 2024-03-01 RX ORDER — AMOXICILLIN AND CLAVULANATE POTASSIUM 875; 125 MG/1; MG/1
875-125 TABLET, COATED ORAL
Qty: 14 | Refills: 0 | Status: ACTIVE | COMMUNITY
Start: 2024-03-01 | End: 1900-01-01

## 2024-03-01 RX ORDER — CIPROFLOXACIN AND DEXAMETHASONE 3; 1 MG/ML; MG/ML
0.3-0.1 SUSPENSION/ DROPS AURICULAR (OTIC)
Qty: 1 | Refills: 0 | Status: ACTIVE | COMMUNITY
Start: 2024-03-01 | End: 1900-01-01

## 2024-03-01 NOTE — HISTORY OF PRESENT ILLNESS
[FreeTextEntry1] : Right ear pain [de-identified] : Having pain of inner right ear and swelling of right periauricular area.  States had piercing of right tragus one month ago and started have consistent pruritis of the area. She returned recently where they exchanged to another earring that was a different material.  Denies fever

## 2024-03-01 NOTE — PLAN
[FreeTextEntry1] : -Given cellulitis like changes along w/ otitis externa, will start oral abx and abx gtt -Ibuprofen 400mg PRN pain

## 2024-03-01 NOTE — PHYSICAL EXAM
[Normal] : no acute distress, well nourished, well developed and well-appearing [de-identified] : Right TM intact, no bulge noted, erythema and swelling of  right external canal, no active drainage  Pain and swelling of periauricular area

## 2024-05-07 ENCOUNTER — TRANSCRIPTION ENCOUNTER (OUTPATIENT)
Age: 44
End: 2024-05-07

## 2024-05-09 ENCOUNTER — EMERGENCY (EMERGENCY)
Facility: HOSPITAL | Age: 44
LOS: 1 days | Discharge: HOME | End: 2024-05-09
Attending: STUDENT IN AN ORGANIZED HEALTH CARE EDUCATION/TRAINING PROGRAM | Admitting: STUDENT IN AN ORGANIZED HEALTH CARE EDUCATION/TRAINING PROGRAM
Payer: COMMERCIAL

## 2024-05-09 ENCOUNTER — APPOINTMENT (OUTPATIENT)
Dept: INTERNAL MEDICINE | Facility: CLINIC | Age: 44
End: 2024-05-09
Payer: COMMERCIAL

## 2024-05-09 VITALS
DIASTOLIC BLOOD PRESSURE: 55 MMHG | SYSTOLIC BLOOD PRESSURE: 92 MMHG | HEART RATE: 91 BPM | OXYGEN SATURATION: 95 % | TEMPERATURE: 99 F | WEIGHT: 126 LBS | BODY MASS INDEX: 25.4 KG/M2 | HEIGHT: 59 IN

## 2024-05-09 VITALS
TEMPERATURE: 98 F | OXYGEN SATURATION: 98 % | SYSTOLIC BLOOD PRESSURE: 122 MMHG | DIASTOLIC BLOOD PRESSURE: 70 MMHG | HEART RATE: 88 BPM | RESPIRATION RATE: 18 BRPM

## 2024-05-09 VITALS
HEART RATE: 89 BPM | WEIGHT: 128.09 LBS | TEMPERATURE: 98 F | RESPIRATION RATE: 19 BRPM | DIASTOLIC BLOOD PRESSURE: 73 MMHG | OXYGEN SATURATION: 97 % | HEIGHT: 59 IN | SYSTOLIC BLOOD PRESSURE: 118 MMHG

## 2024-05-09 DIAGNOSIS — R10.11 RIGHT UPPER QUADRANT PAIN: ICD-10-CM

## 2024-05-09 DIAGNOSIS — R19.8 RIGHT UPPER QUADRANT PAIN: ICD-10-CM

## 2024-05-09 DIAGNOSIS — R50.9 FEVER, UNSPECIFIED: ICD-10-CM

## 2024-05-09 LAB
ALBUMIN SERPL ELPH-MCNC: 3.4 G/DL — SIGNIFICANT CHANGE UP (ref 3.3–5)
ALP SERPL-CCNC: 70 U/L — SIGNIFICANT CHANGE UP (ref 40–120)
ALT FLD-CCNC: 17 U/L — SIGNIFICANT CHANGE UP (ref 12–78)
ANION GAP SERPL CALC-SCNC: 4 MMOL/L — LOW (ref 5–17)
APPEARANCE UR: CLEAR — SIGNIFICANT CHANGE UP
AST SERPL-CCNC: 18 U/L — SIGNIFICANT CHANGE UP (ref 15–37)
BASOPHILS # BLD AUTO: 0.03 K/UL — SIGNIFICANT CHANGE UP (ref 0–0.2)
BASOPHILS NFR BLD AUTO: 0.3 % — SIGNIFICANT CHANGE UP (ref 0–2)
BILIRUB SERPL-MCNC: 0.2 MG/DL — SIGNIFICANT CHANGE UP (ref 0.2–1.2)
BILIRUB UR QL STRIP: NORMAL
BILIRUB UR-MCNC: NEGATIVE — SIGNIFICANT CHANGE UP
BUN SERPL-MCNC: 4 MG/DL — LOW (ref 7–23)
CALCIUM SERPL-MCNC: 8.8 MG/DL — SIGNIFICANT CHANGE UP (ref 8.5–10.1)
CHLORIDE SERPL-SCNC: 106 MMOL/L — SIGNIFICANT CHANGE UP (ref 96–108)
CLARITY UR: CLEAR
CO2 SERPL-SCNC: 26 MMOL/L — SIGNIFICANT CHANGE UP (ref 22–31)
COLLECTION METHOD: NORMAL
COLOR SPEC: YELLOW — SIGNIFICANT CHANGE UP
CREAT SERPL-MCNC: 0.62 MG/DL — SIGNIFICANT CHANGE UP (ref 0.5–1.3)
DIFF PNL FLD: NEGATIVE — SIGNIFICANT CHANGE UP
EGFR: 115 ML/MIN/1.73M2 — SIGNIFICANT CHANGE UP
EOSINOPHIL # BLD AUTO: 0.02 K/UL — SIGNIFICANT CHANGE UP (ref 0–0.5)
EOSINOPHIL NFR BLD AUTO: 0.2 % — SIGNIFICANT CHANGE UP (ref 0–6)
FLUAV AG NPH QL: SIGNIFICANT CHANGE UP
FLUAV SPEC QL CULT: NORMAL
FLUBV AG NPH QL: SIGNIFICANT CHANGE UP
FLUBV AG SPEC QL IA: NORMAL
GLUCOSE SERPL-MCNC: 101 MG/DL — HIGH (ref 70–99)
GLUCOSE UR QL: NEGATIVE MG/DL — SIGNIFICANT CHANGE UP
GLUCOSE UR-MCNC: NORMAL
HCG UR QL: 0.2 EU/DL
HCG UR QL: NEGATIVE — SIGNIFICANT CHANGE UP
HCT VFR BLD CALC: 39.3 % — SIGNIFICANT CHANGE UP (ref 34.5–45)
HGB BLD-MCNC: 12.6 G/DL — SIGNIFICANT CHANGE UP (ref 11.5–15.5)
HGB UR QL STRIP.AUTO: NORMAL
IMM GRANULOCYTES NFR BLD AUTO: 0.6 % — SIGNIFICANT CHANGE UP (ref 0–0.9)
KETONES UR-MCNC: NEGATIVE MG/DL — SIGNIFICANT CHANGE UP
KETONES UR-MCNC: NORMAL
LACTATE SERPL-SCNC: 1.2 MMOL/L — SIGNIFICANT CHANGE UP (ref 0.7–2)
LEUKOCYTE ESTERASE UR QL STRIP: NORMAL
LEUKOCYTE ESTERASE UR-ACNC: NEGATIVE — SIGNIFICANT CHANGE UP
LYMPHOCYTES # BLD AUTO: 1.47 K/UL — SIGNIFICANT CHANGE UP (ref 1–3.3)
LYMPHOCYTES # BLD AUTO: 14.8 % — SIGNIFICANT CHANGE UP (ref 13–44)
MCHC RBC-ENTMCNC: 28.8 PG — SIGNIFICANT CHANGE UP (ref 27–34)
MCHC RBC-ENTMCNC: 32.1 GM/DL — SIGNIFICANT CHANGE UP (ref 32–36)
MCV RBC AUTO: 89.7 FL — SIGNIFICANT CHANGE UP (ref 80–100)
MONOCYTES # BLD AUTO: 0.89 K/UL — SIGNIFICANT CHANGE UP (ref 0–0.9)
MONOCYTES NFR BLD AUTO: 9 % — SIGNIFICANT CHANGE UP (ref 2–14)
NEUTROPHILS # BLD AUTO: 7.47 K/UL — HIGH (ref 1.8–7.4)
NEUTROPHILS NFR BLD AUTO: 75.1 % — SIGNIFICANT CHANGE UP (ref 43–77)
NITRITE UR QL STRIP: NORMAL
NITRITE UR-MCNC: NEGATIVE — SIGNIFICANT CHANGE UP
NRBC # BLD: 0 /100 WBCS — SIGNIFICANT CHANGE UP (ref 0–0)
PH UR STRIP: 7
PH UR: 7.5 — SIGNIFICANT CHANGE UP (ref 5–8)
PLATELET # BLD AUTO: 310 K/UL — SIGNIFICANT CHANGE UP (ref 150–400)
POTASSIUM SERPL-MCNC: 3.7 MMOL/L — SIGNIFICANT CHANGE UP (ref 3.5–5.3)
POTASSIUM SERPL-SCNC: 3.7 MMOL/L — SIGNIFICANT CHANGE UP (ref 3.5–5.3)
PROT SERPL-MCNC: 8.1 G/DL — SIGNIFICANT CHANGE UP (ref 6–8.3)
PROT UR STRIP-MCNC: NORMAL
PROT UR-MCNC: NEGATIVE MG/DL — SIGNIFICANT CHANGE UP
RBC # BLD: 4.38 M/UL — SIGNIFICANT CHANGE UP (ref 3.8–5.2)
RBC # FLD: 13 % — SIGNIFICANT CHANGE UP (ref 10.3–14.5)
RSV RNA NPH QL NAA+NON-PROBE: SIGNIFICANT CHANGE UP
SARS-COV-2 RNA SPEC QL NAA+PROBE: SIGNIFICANT CHANGE UP
SODIUM SERPL-SCNC: 136 MMOL/L — SIGNIFICANT CHANGE UP (ref 135–145)
SP GR SPEC: 1 — LOW (ref 1–1.03)
SP GR UR STRIP: 1.01
UROBILINOGEN FLD QL: 0.2 MG/DL — SIGNIFICANT CHANGE UP (ref 0.2–1)
WBC # BLD: 9.94 K/UL — SIGNIFICANT CHANGE UP (ref 3.8–10.5)
WBC # FLD AUTO: 9.94 K/UL — SIGNIFICANT CHANGE UP (ref 3.8–10.5)

## 2024-05-09 PROCEDURE — 85025 COMPLETE CBC W/AUTO DIFF WBC: CPT

## 2024-05-09 PROCEDURE — 81025 URINE PREGNANCY TEST: CPT

## 2024-05-09 PROCEDURE — 81003 URINALYSIS AUTO W/O SCOPE: CPT | Mod: QW

## 2024-05-09 PROCEDURE — 99284 EMERGENCY DEPT VISIT MOD MDM: CPT

## 2024-05-09 PROCEDURE — 83605 ASSAY OF LACTIC ACID: CPT

## 2024-05-09 PROCEDURE — 76705 ECHO EXAM OF ABDOMEN: CPT | Mod: 26

## 2024-05-09 PROCEDURE — 87637 SARSCOV2&INF A&B&RSV AMP PRB: CPT

## 2024-05-09 PROCEDURE — 87804 INFLUENZA ASSAY W/OPTIC: CPT | Mod: QW

## 2024-05-09 PROCEDURE — 87040 BLOOD CULTURE FOR BACTERIA: CPT

## 2024-05-09 PROCEDURE — 71045 X-RAY EXAM CHEST 1 VIEW: CPT | Mod: 26

## 2024-05-09 PROCEDURE — 96374 THER/PROPH/DIAG INJ IV PUSH: CPT

## 2024-05-09 PROCEDURE — G2211 COMPLEX E/M VISIT ADD ON: CPT

## 2024-05-09 PROCEDURE — 99284 EMERGENCY DEPT VISIT MOD MDM: CPT | Mod: 25

## 2024-05-09 PROCEDURE — 99214 OFFICE O/P EST MOD 30 MIN: CPT | Mod: 25

## 2024-05-09 PROCEDURE — 96361 HYDRATE IV INFUSION ADD-ON: CPT

## 2024-05-09 PROCEDURE — 80053 COMPREHEN METABOLIC PANEL: CPT

## 2024-05-09 PROCEDURE — 81003 URINALYSIS AUTO W/O SCOPE: CPT

## 2024-05-09 PROCEDURE — 36415 COLL VENOUS BLD VENIPUNCTURE: CPT

## 2024-05-09 PROCEDURE — 87086 URINE CULTURE/COLONY COUNT: CPT

## 2024-05-09 PROCEDURE — 76705 ECHO EXAM OF ABDOMEN: CPT

## 2024-05-09 PROCEDURE — 96375 TX/PRO/DX INJ NEW DRUG ADDON: CPT

## 2024-05-09 PROCEDURE — 71045 X-RAY EXAM CHEST 1 VIEW: CPT

## 2024-05-09 RX ORDER — KETOROLAC TROMETHAMINE 30 MG/ML
30 SYRINGE (ML) INJECTION ONCE
Refills: 0 | Status: DISCONTINUED | OUTPATIENT
Start: 2024-05-09 | End: 2024-05-09

## 2024-05-09 RX ORDER — SODIUM CHLORIDE 9 MG/ML
800 INJECTION INTRAMUSCULAR; INTRAVENOUS; SUBCUTANEOUS ONCE
Refills: 0 | Status: COMPLETED | OUTPATIENT
Start: 2024-05-09 | End: 2024-05-09

## 2024-05-09 RX ORDER — SODIUM CHLORIDE 9 MG/ML
1000 INJECTION INTRAMUSCULAR; INTRAVENOUS; SUBCUTANEOUS ONCE
Refills: 0 | Status: COMPLETED | OUTPATIENT
Start: 2024-05-09 | End: 2024-05-09

## 2024-05-09 RX ORDER — ONDANSETRON 8 MG/1
4 TABLET, FILM COATED ORAL ONCE
Refills: 0 | Status: COMPLETED | OUTPATIENT
Start: 2024-05-09 | End: 2024-05-09

## 2024-05-09 RX ORDER — FAMOTIDINE 10 MG/ML
1 INJECTION INTRAVENOUS
Qty: 60 | Refills: 0
Start: 2024-05-09 | End: 2024-06-07

## 2024-05-09 RX ADMIN — SODIUM CHLORIDE 800 MILLILITER(S): 9 INJECTION INTRAMUSCULAR; INTRAVENOUS; SUBCUTANEOUS at 16:13

## 2024-05-09 RX ADMIN — Medication 30 MILLIGRAM(S): at 16:13

## 2024-05-09 RX ADMIN — SODIUM CHLORIDE 1000 MILLILITER(S): 9 INJECTION INTRAMUSCULAR; INTRAVENOUS; SUBCUTANEOUS at 15:23

## 2024-05-09 RX ADMIN — SODIUM CHLORIDE 800 MILLILITER(S): 9 INJECTION INTRAMUSCULAR; INTRAVENOUS; SUBCUTANEOUS at 17:15

## 2024-05-09 RX ADMIN — ONDANSETRON 4 MILLIGRAM(S): 8 TABLET, FILM COATED ORAL at 16:13

## 2024-05-09 RX ADMIN — SODIUM CHLORIDE 1000 MILLILITER(S): 9 INJECTION INTRAMUSCULAR; INTRAVENOUS; SUBCUTANEOUS at 16:23

## 2024-05-09 NOTE — ED PROVIDER NOTE - PROGRESS NOTE DETAILS
Patient reevaluated, lab work grossly unremarkable.  No evidence of infection noted.  Symptoms have improved here.  Workup negative for acute pathology.  Given the upper abdominal pain, will send medication for possible gastritis.  Will recommend primary care follow-up.  Plan to discharge patient. Return to ED precautions were discussed with the patient/family. All questions were answered. Jorge Hernandez MD.

## 2024-05-09 NOTE — ED PROVIDER NOTE - OBJECTIVE STATEMENT
Patient with a past medical history of hyperlipidemia is presenting with concern for right-sided flank and abdominal pain.  Reports prior history of pyelonephritis so her primary care doctor was concerned for this.  Symptoms have been present for the past couple of days.  Associated nausea and pain that is worse when she eats.  Has been eating fruits and vegetables which has improved her symptoms.  Denies any dysuria or polyuria.  No lower abdominal pain.

## 2024-05-09 NOTE — PHYSICAL EXAM
[Uncomfortable] : uncomfortable [Normal] : normal rate, regular rhythm, normal S1 and S2 and no murmur heard [RUQ] : in the right upper quadrant [Concepcion's] : a positive Concepcion's sign [Firm] : not firm [Rigid] : not rigid [Rebound] : no rebound [Guarding] : no guarding

## 2024-05-09 NOTE — ED ADULT TRIAGE NOTE - CHIEF COMPLAINT QUOTE
c/o R sided flank pain, nausea. pt sent by PCP (MD Murillo) for possible bladder infection. pt states R sided flank pain since tuesday. Denies burning/pain/blood when voiding. Denies fevers, chills. Ambulates independently.reports decrease appetite. No pain medicine taken for today. no allergies to medicine. hx HLD

## 2024-05-09 NOTE — ED PROVIDER NOTE - CARE PROVIDER_API CALL
Elizabeth Upton  Internal Medicine  [address] [place] [address]  Phone: [phone]  Fax: [phone]  Follow Up Time: 4-6 Days

## 2024-05-09 NOTE — ED PROVIDER NOTE - CLINICAL SUMMARY MEDICAL DECISION MAKING FREE TEXT BOX
History and exam concerning for cholecystitis versus biliary colic versus gastritis.  No lower abdominal pain to suggest appendicitis.  With prior history, will check urinalysis for pyelonephritis as well as renal stone.  Plan on lab work, imaging and medications.

## 2024-05-09 NOTE — ED PROVIDER NOTE - PHYSICAL EXAMINATION
Constitutional: Awake, Alert, non-toxic. No acute distress.  HEAD: Normocephalic, atraumatic.   EYES: PERRL, EOM intact, conjunctiva and sclera are clear bilaterally.  ENT: External ears normal. No rhinorrhea, no tracheal deviation   NECK: Supple, non-tender  CARDIOVASCULAR: regular rate and rhythm.  RESPIRATORY: Normal respiratory effort; breath sounds CTAB, no wheezes, rhonchi, or rales. Speaking in full sentences. No accessory muscle use.   ABDOMEN: Soft; RUQ and epigastric TTP, non-distended. No rebound or guarding.   MSK:  no lower extremity edema, no deformities  SKIN: Warm, dry  NEURO: A&O x3. Sensory and motor functions are grossly intact. Speech is normal. No facial droop.  PSYCH: Appearance and judgement seem appropriate for gender and age.

## 2024-05-09 NOTE — ED PROVIDER NOTE - PATIENT PORTAL LINK FT
You can access the FollowMyHealth Patient Portal offered by [facility] by registering at the following website: [email or web address]. By joining [facility]’s FollowMyHealth portal, you will also be able to view your health information using other applications (apps) compatible with our system.

## 2024-05-09 NOTE — ED PROVIDER NOTE - ATTENDING APP SHARED VISIT CONTRIBUTION OF CARE
I, Jorge Hernandez MD, have seen and examined the patient on the date of service.  I agree with the MORIAH's assessment as written, with exceptions or additions as noted below or in a separate note.

## 2024-05-09 NOTE — REVIEW OF SYSTEMS
[Fever] : fever [Chills] : chills [Night Sweats] : night sweats [Abdominal Pain] : abdominal pain [Nausea] : nausea [Negative] : Respiratory [Vomiting] : no vomiting [Dysuria] : no dysuria

## 2024-05-09 NOTE — HISTORY OF PRESENT ILLNESS
[FreeTextEntry1] : Fever, abdominal pain  [de-identified] : Ms. CECI AGUILAR is a 43 year old female hx of HLD, reflux here for abdominal pain For 48 hours has been having chills, fever and RUQ pain. +Nausea Tolerating only small amount of fruit No URI symptoms RUQ pain is constant, worsened with deep inspiration and walking-pain rated 7/10 today Pain radiates to back, flank BP lower than baseline today Had spicy food the night before symptoms began Currently on her menses

## 2024-05-09 NOTE — ED PROVIDER NOTE - ATTESTATION, MLM
I have reviewed and confirmed nurses' notes for patient's medications, allergies, medical history, and surgical history. Epidermal Autograft Text: The defect edges were debeveled with a #15 scalpel blade.  Given the location of the defect, shape of the defect and the proximity to free margins an epidermal autograft was deemed most appropriate.  Using a sterile surgical marker, the primary defect shape was transferred to the donor site. The epidermal graft was then harvested.  The skin graft was then placed in the primary defect and oriented appropriately.

## 2024-05-10 ENCOUNTER — NON-APPOINTMENT (OUTPATIENT)
Age: 44
End: 2024-05-10

## 2024-05-10 LAB
CULTURE RESULTS: SIGNIFICANT CHANGE UP
SPECIMEN SOURCE: SIGNIFICANT CHANGE UP

## 2024-05-14 LAB
CULTURE RESULTS: SIGNIFICANT CHANGE UP
CULTURE RESULTS: SIGNIFICANT CHANGE UP
SPECIMEN SOURCE: SIGNIFICANT CHANGE UP
SPECIMEN SOURCE: SIGNIFICANT CHANGE UP

## 2024-05-16 ENCOUNTER — RESULT REVIEW (OUTPATIENT)
Age: 44
End: 2024-05-16

## 2024-05-16 ENCOUNTER — APPOINTMENT (OUTPATIENT)
Dept: ULTRASOUND IMAGING | Facility: CLINIC | Age: 44
End: 2024-05-16
Payer: COMMERCIAL

## 2024-05-16 ENCOUNTER — APPOINTMENT (OUTPATIENT)
Dept: MAMMOGRAPHY | Facility: CLINIC | Age: 44
End: 2024-05-16
Payer: COMMERCIAL

## 2024-05-16 ENCOUNTER — OUTPATIENT (OUTPATIENT)
Dept: OUTPATIENT SERVICES | Facility: HOSPITAL | Age: 44
LOS: 1 days | End: 2024-05-16
Payer: COMMERCIAL

## 2024-05-16 DIAGNOSIS — Z12.31 ENCOUNTER FOR SCREENING MAMMOGRAM FOR MALIGNANT NEOPLASM OF BREAST: ICD-10-CM

## 2024-05-16 DIAGNOSIS — R92.30 DENSE BREASTS, UNSPECIFIED: ICD-10-CM

## 2024-05-16 DIAGNOSIS — Z00.8 ENCOUNTER FOR OTHER GENERAL EXAMINATION: ICD-10-CM

## 2024-05-16 PROCEDURE — 77066 DX MAMMO INCL CAD BI: CPT | Mod: 26

## 2024-05-16 PROCEDURE — 76641 ULTRASOUND BREAST COMPLETE: CPT

## 2024-05-16 PROCEDURE — G0279: CPT | Mod: 26

## 2024-05-16 PROCEDURE — 76641 ULTRASOUND BREAST COMPLETE: CPT | Mod: 26,50

## 2024-05-16 PROCEDURE — 77062 BREAST TOMOSYNTHESIS BI: CPT | Mod: 26

## 2024-05-16 PROCEDURE — G0279: CPT

## 2024-05-16 PROCEDURE — 77066 DX MAMMO INCL CAD BI: CPT

## 2024-05-20 DIAGNOSIS — N63.0 UNSPECIFIED LUMP IN UNSPECIFIED BREAST: ICD-10-CM

## 2024-05-21 ENCOUNTER — APPOINTMENT (OUTPATIENT)
Dept: OBGYN | Facility: CLINIC | Age: 44
End: 2024-05-21
Payer: COMMERCIAL

## 2024-05-21 VITALS
HEIGHT: 59 IN | SYSTOLIC BLOOD PRESSURE: 114 MMHG | BODY MASS INDEX: 24.8 KG/M2 | DIASTOLIC BLOOD PRESSURE: 79 MMHG | WEIGHT: 123 LBS

## 2024-05-21 DIAGNOSIS — Z01.419 ENCOUNTER FOR GYNECOLOGICAL EXAMINATION (GENERAL) (ROUTINE) W/OUT ABNORMAL FINDINGS: ICD-10-CM

## 2024-05-21 PROCEDURE — 99396 PREV VISIT EST AGE 40-64: CPT

## 2024-05-21 NOTE — PHYSICAL EXAM
[Chaperone Present] : A chaperone was present in the examining room during all aspects of the physical examination [93089] : A chaperone was present during the pelvic exam. [FreeTextEntry2] : Candy ALVARADO [Appropriately responsive] : appropriately responsive [Alert] : alert [No Acute Distress] : no acute distress [Soft] : soft [Non-tender] : non-tender [Non-distended] : non-distended [No Lesions] : no lesions [No Mass] : no mass [Oriented x3] : oriented x3 [Examination Of The Breasts] : a normal appearance [No Masses] : no breast masses were palpable [Labia Majora] : normal [Labia Minora] : normal [Normal] : normal [Uterine Adnexae] : normal

## 2024-05-21 NOTE — HISTORY OF PRESENT ILLNESS
[Patient reported mammogram was normal] : Patient reported mammogram was normal [Patient reported breast sonogram was normal] : Patient reported breast sonogram was normal [Patient reported PAP Smear was normal] : Patient reported PAP Smear was normal [FreeTextEntry1] : 44 y/o  female, LMP: 24, presents for annual GYN visit.  Patient denies any GYN complaints.  Menstrual Cycle: q 28-35 days, monthly, mild pain and bleeding. Sexual Activity: monogamous/ Contraception:  had vasectomy  Social/Mental Health: denies any depression, suicidal ideation or any thoughts of personal harm.  ROS: Denies fever/chills, HA, Cough/sore throat, CP, SOB, N/V, Diarrhea/Constipation, Pelvic pain, Urinary frequency/urgency/incontinence, irregular vaginal bleeding, discharge or irritation.  Medical History OBHX:  GYN HX: 1 , 1 c/section, left ovarian cyst PMH: HLD, gastritis PSH: 1 c/section Meds: med for gastritis Allergies: NKDA Social HX: social ETOH use Fam Hx: sister: h/o benign breast tumors [Mammogramdate] : 5/16/24 [BreastSonogramDate] : 5/16/24 [PapSmeardate] : 5/2023 [Normal Amount/Duration] :  normal amount and duration [Patient refuses STI testing] : Patient refuses STI testing

## 2024-05-28 LAB
CYTOLOGY CVX/VAG DOC THIN PREP: NORMAL
HPV HIGH+LOW RISK DNA PNL CVX: NOT DETECTED

## 2025-01-23 ENCOUNTER — OUTPATIENT (OUTPATIENT)
Dept: OUTPATIENT SERVICES | Facility: HOSPITAL | Age: 45
LOS: 1 days | End: 2025-01-23
Payer: COMMERCIAL

## 2025-01-23 ENCOUNTER — APPOINTMENT (OUTPATIENT)
Dept: ULTRASOUND IMAGING | Facility: CLINIC | Age: 45
End: 2025-01-23
Payer: COMMERCIAL

## 2025-01-23 ENCOUNTER — RESULT REVIEW (OUTPATIENT)
Age: 45
End: 2025-01-23

## 2025-01-23 DIAGNOSIS — N63.0 UNSPECIFIED LUMP IN UNSPECIFIED BREAST: ICD-10-CM

## 2025-01-23 PROCEDURE — 76642 ULTRASOUND BREAST LIMITED: CPT

## 2025-01-23 PROCEDURE — 76642 ULTRASOUND BREAST LIMITED: CPT | Mod: 26,50

## 2025-05-13 ENCOUNTER — LABORATORY RESULT (OUTPATIENT)
Age: 45
End: 2025-05-13

## 2025-05-13 ENCOUNTER — NON-APPOINTMENT (OUTPATIENT)
Age: 45
End: 2025-05-13

## 2025-05-13 ENCOUNTER — APPOINTMENT (OUTPATIENT)
Dept: INTERNAL MEDICINE | Facility: CLINIC | Age: 45
End: 2025-05-13
Payer: COMMERCIAL

## 2025-05-13 VITALS
WEIGHT: 123 LBS | HEART RATE: 61 BPM | TEMPERATURE: 98.1 F | SYSTOLIC BLOOD PRESSURE: 102 MMHG | RESPIRATION RATE: 16 BRPM | BODY MASS INDEX: 24.84 KG/M2 | OXYGEN SATURATION: 98 % | DIASTOLIC BLOOD PRESSURE: 60 MMHG

## 2025-05-13 DIAGNOSIS — E78.5 HYPERLIPIDEMIA, UNSPECIFIED: ICD-10-CM

## 2025-05-13 DIAGNOSIS — Z00.00 ENCOUNTER FOR GENERAL ADULT MEDICAL EXAMINATION W/OUT ABNORMAL FINDINGS: ICD-10-CM

## 2025-05-13 DIAGNOSIS — R19.8 OTHER SPECIFIED SYMPTOMS AND SIGNS INVOLVING THE DIGESTIVE SYSTEM AND ABDOMEN: ICD-10-CM

## 2025-05-13 PROCEDURE — 36415 COLL VENOUS BLD VENIPUNCTURE: CPT

## 2025-05-13 PROCEDURE — 99396 PREV VISIT EST AGE 40-64: CPT

## 2025-05-14 ENCOUNTER — TRANSCRIPTION ENCOUNTER (OUTPATIENT)
Age: 45
End: 2025-05-14

## 2025-05-14 LAB
ALBUMIN SERPL ELPH-MCNC: 4.4 G/DL
ALP BLD-CCNC: 66 U/L
ALT SERPL-CCNC: 35 U/L
ANION GAP SERPL CALC-SCNC: 14 MMOL/L
APPEARANCE: CLEAR
AST SERPL-CCNC: 23 U/L
BILIRUB SERPL-MCNC: 0.2 MG/DL
BILIRUBIN URINE: NEGATIVE
BLOOD URINE: NEGATIVE
BUN SERPL-MCNC: 10 MG/DL
C TRACH RRNA SPEC QL NAA+PROBE: NOT DETECTED
CALCIUM SERPL-MCNC: 9 MG/DL
CHLORIDE SERPL-SCNC: 106 MMOL/L
CHOLEST SERPL-MCNC: 153 MG/DL
CO2 SERPL-SCNC: 19 MMOL/L
COLOR: YELLOW
CREAT SERPL-MCNC: 0.57 MG/DL
EGFRCR SERPLBLD CKD-EPI 2021: 116 ML/MIN/1.73M2
ESTIMATED AVERAGE GLUCOSE: 100 MG/DL
GLUCOSE QUALITATIVE U: NEGATIVE MG/DL
GLUCOSE SERPL-MCNC: 90 MG/DL
HBA1C MFR BLD HPLC: 5.1 %
HCV AB SER QL: NONREACTIVE
HCV S/CO RATIO: 0.13 S/CO
HDLC SERPL-MCNC: 36 MG/DL
HIV1+2 AB SPEC QL IA.RAPID: NONREACTIVE
KETONES URINE: NEGATIVE MG/DL
LDLC SERPL-MCNC: 80 MG/DL
LEUKOCYTE ESTERASE URINE: NEGATIVE
N GONORRHOEA RRNA SPEC QL NAA+PROBE: NOT DETECTED
NITRITE URINE: NEGATIVE
NONHDLC SERPL-MCNC: 116 MG/DL
PH URINE: 6.5
POTASSIUM SERPL-SCNC: 3.9 MMOL/L
PROT SERPL-MCNC: 7.1 G/DL
PROTEIN URINE: NEGATIVE MG/DL
SODIUM SERPL-SCNC: 138 MMOL/L
SOURCE AMPLIFICATION: NORMAL
SPECIFIC GRAVITY URINE: 1.01
T PALLIDUM AB SER QL IA: NEGATIVE
TRIGL SERPL-MCNC: 214 MG/DL
TSH SERPL-ACNC: 1.11 UIU/ML
UROBILINOGEN URINE: 0.2 MG/DL

## 2025-05-16 ENCOUNTER — APPOINTMENT (OUTPATIENT)
Dept: RADIOLOGY | Facility: CLINIC | Age: 45
End: 2025-05-16
Payer: COMMERCIAL

## 2025-05-16 ENCOUNTER — OUTPATIENT (OUTPATIENT)
Dept: OUTPATIENT SERVICES | Facility: HOSPITAL | Age: 45
LOS: 1 days | End: 2025-05-16
Payer: COMMERCIAL

## 2025-05-16 DIAGNOSIS — R19.8 OTHER SPECIFIED SYMPTOMS AND SIGNS INVOLVING THE DIGESTIVE SYSTEM AND ABDOMEN: ICD-10-CM

## 2025-05-16 PROCEDURE — 74019 RADEX ABDOMEN 2 VIEWS: CPT

## 2025-05-16 PROCEDURE — 74019 RADEX ABDOMEN 2 VIEWS: CPT | Mod: 26

## 2025-05-18 LAB
BASOPHILS # BLD AUTO: 0.04 K/UL
BASOPHILS NFR BLD AUTO: 0.5 %
EOSINOPHIL # BLD AUTO: 0.1 K/UL
EOSINOPHIL NFR BLD AUTO: 1.3 %
HCT VFR BLD CALC: 40.6 %
HGB BLD-MCNC: 12.8 G/DL
IMM GRANULOCYTES NFR BLD AUTO: 0.4 %
LYMPHOCYTES # BLD AUTO: 2.54 K/UL
LYMPHOCYTES NFR BLD AUTO: 32.7 %
MAN DIFF?: NORMAL
MCHC RBC-ENTMCNC: 26.8 PG
MCHC RBC-ENTMCNC: 31.5 G/DL
MCV RBC AUTO: 84.9 FL
MONOCYTES # BLD AUTO: 0.35 K/UL
MONOCYTES NFR BLD AUTO: 4.5 %
NEUTROPHILS # BLD AUTO: 4.71 K/UL
NEUTROPHILS NFR BLD AUTO: 60.6 %
PLATELET # BLD AUTO: 342 K/UL
RBC # BLD: 4.78 M/UL
RBC # FLD: 15.7 %
WBC # FLD AUTO: 7.77 K/UL

## 2025-05-20 ENCOUNTER — TRANSCRIPTION ENCOUNTER (OUTPATIENT)
Age: 45
End: 2025-05-20

## 2025-05-27 ENCOUNTER — APPOINTMENT (OUTPATIENT)
Dept: OBGYN | Facility: CLINIC | Age: 45
End: 2025-05-27
Payer: COMMERCIAL

## 2025-05-27 VITALS
SYSTOLIC BLOOD PRESSURE: 137 MMHG | WEIGHT: 127 LBS | DIASTOLIC BLOOD PRESSURE: 67 MMHG | BODY MASS INDEX: 25.6 KG/M2 | HEIGHT: 59 IN

## 2025-05-27 DIAGNOSIS — Z01.419 ENCOUNTER FOR GYNECOLOGICAL EXAMINATION (GENERAL) (ROUTINE) W/OUT ABNORMAL FINDINGS: ICD-10-CM

## 2025-05-27 PROCEDURE — 99459 PELVIC EXAMINATION: CPT | Mod: NC

## 2025-05-27 PROCEDURE — 99396 PREV VISIT EST AGE 40-64: CPT

## 2025-06-02 LAB
CYTOLOGY CVX/VAG DOC THIN PREP: NORMAL
HPV HIGH+LOW RISK DNA PNL CVX: NOT DETECTED

## 2025-06-10 ENCOUNTER — APPOINTMENT (OUTPATIENT)
Dept: MAMMOGRAPHY | Facility: CLINIC | Age: 45
End: 2025-06-10
Payer: COMMERCIAL

## 2025-06-10 ENCOUNTER — RESULT REVIEW (OUTPATIENT)
Age: 45
End: 2025-06-10

## 2025-06-10 ENCOUNTER — OUTPATIENT (OUTPATIENT)
Dept: OUTPATIENT SERVICES | Facility: HOSPITAL | Age: 45
LOS: 1 days | End: 2025-06-10
Payer: COMMERCIAL

## 2025-06-10 ENCOUNTER — APPOINTMENT (OUTPATIENT)
Dept: ULTRASOUND IMAGING | Facility: CLINIC | Age: 45
End: 2025-06-10
Payer: COMMERCIAL

## 2025-06-10 DIAGNOSIS — Z00.00 ENCOUNTER FOR GENERAL ADULT MEDICAL EXAMINATION WITHOUT ABNORMAL FINDINGS: ICD-10-CM

## 2025-06-10 PROCEDURE — 76641 ULTRASOUND BREAST COMPLETE: CPT

## 2025-06-10 PROCEDURE — 77066 DX MAMMO INCL CAD BI: CPT | Mod: 26

## 2025-06-10 PROCEDURE — 76641 ULTRASOUND BREAST COMPLETE: CPT | Mod: 26,50

## 2025-06-10 PROCEDURE — 77066 DX MAMMO INCL CAD BI: CPT

## 2025-06-10 PROCEDURE — G0279: CPT | Mod: 26

## 2025-06-10 PROCEDURE — 77062 BREAST TOMOSYNTHESIS BI: CPT | Mod: 26

## 2025-06-10 PROCEDURE — G0279: CPT

## 2025-09-02 ENCOUNTER — APPOINTMENT (OUTPATIENT)
Dept: OBGYN | Facility: CLINIC | Age: 45
End: 2025-09-02
Payer: COMMERCIAL

## 2025-09-02 VITALS
DIASTOLIC BLOOD PRESSURE: 78 MMHG | HEIGHT: 59 IN | BODY MASS INDEX: 25 KG/M2 | HEART RATE: 71 BPM | WEIGHT: 124 LBS | SYSTOLIC BLOOD PRESSURE: 116 MMHG

## 2025-09-02 PROCEDURE — 99212 OFFICE O/P EST SF 10 MIN: CPT

## 2025-09-02 PROCEDURE — 99459 PELVIC EXAMINATION: CPT | Mod: NC

## 2025-09-05 ENCOUNTER — APPOINTMENT (OUTPATIENT)
Dept: OBGYN | Facility: CLINIC | Age: 45
End: 2025-09-05
Payer: COMMERCIAL

## 2025-09-05 VITALS
WEIGHT: 124 LBS | BODY MASS INDEX: 25 KG/M2 | SYSTOLIC BLOOD PRESSURE: 110 MMHG | HEIGHT: 59 IN | DIASTOLIC BLOOD PRESSURE: 73 MMHG

## 2025-09-05 DIAGNOSIS — N75.1 ABSCESS OF BARTHOLIN'S GLAND: ICD-10-CM

## 2025-09-05 DIAGNOSIS — N75.0 CYST OF BARTHOLIN'S GLAND: ICD-10-CM

## 2025-09-05 PROCEDURE — 56420 I&D BARTHOLINS GLAND ABSCESS: CPT

## 2025-09-05 RX ORDER — SULFAMETHOXAZOLE AND TRIMETHOPRIM 800; 160 MG/1; MG/1
800-160 TABLET ORAL TWICE DAILY
Qty: 20 | Refills: 0 | Status: ACTIVE | COMMUNITY
Start: 2025-09-05 | End: 1900-01-01

## 2025-09-05 RX ORDER — SULFAMETHOXAZOLE AND TRIMETHOPRIM 800; 160 MG/1; MG/1
800-160 TABLET ORAL TWICE DAILY
Qty: 10 | Refills: 0 | Status: ACTIVE | COMMUNITY
Start: 2025-09-05 | End: 1900-01-01

## 2025-09-11 LAB — BACTERIA WND CULT: ABNORMAL

## 2025-09-18 ENCOUNTER — APPOINTMENT (OUTPATIENT)
Dept: OBGYN | Facility: CLINIC | Age: 45
End: 2025-09-18
Payer: COMMERCIAL

## 2025-09-18 VITALS
HEIGHT: 59 IN | DIASTOLIC BLOOD PRESSURE: 83 MMHG | WEIGHT: 124 LBS | SYSTOLIC BLOOD PRESSURE: 124 MMHG | BODY MASS INDEX: 25 KG/M2

## 2025-09-18 DIAGNOSIS — N75.0 CYST OF BARTHOLIN'S GLAND: ICD-10-CM

## 2025-09-18 PROCEDURE — 56420 I&D BARTHOLINS GLAND ABSCESS: CPT

## 2025-09-18 RX ORDER — CEPHALEXIN 500 MG/1
500 CAPSULE ORAL 3 TIMES DAILY
Qty: 15 | Refills: 0 | Status: ACTIVE | COMMUNITY
Start: 2025-09-18 | End: 1900-01-01